# Patient Record
Sex: FEMALE | Race: WHITE | Employment: OTHER | URBAN - METROPOLITAN AREA
[De-identification: names, ages, dates, MRNs, and addresses within clinical notes are randomized per-mention and may not be internally consistent; named-entity substitution may affect disease eponyms.]

---

## 2017-03-02 ENCOUNTER — APPOINTMENT (OUTPATIENT)
Dept: GENERAL RADIOLOGY | Age: 82
End: 2017-03-02
Attending: EMERGENCY MEDICINE
Payer: MEDICARE

## 2017-03-02 ENCOUNTER — APPOINTMENT (OUTPATIENT)
Dept: CT IMAGING | Age: 82
End: 2017-03-02
Attending: EMERGENCY MEDICINE
Payer: MEDICARE

## 2017-03-02 ENCOUNTER — HOSPITAL ENCOUNTER (EMERGENCY)
Age: 82
Discharge: OTHER INSTITUTION | End: 2017-03-02
Attending: EMERGENCY MEDICINE
Payer: MEDICARE

## 2017-03-02 VITALS
WEIGHT: 126 LBS | TEMPERATURE: 97.8 F | HEART RATE: 78 BPM | SYSTOLIC BLOOD PRESSURE: 154 MMHG | OXYGEN SATURATION: 96 % | DIASTOLIC BLOOD PRESSURE: 84 MMHG | BODY MASS INDEX: 22.32 KG/M2 | RESPIRATION RATE: 16 BRPM | HEIGHT: 63 IN

## 2017-03-02 DIAGNOSIS — R05.9 COUGH: Primary | ICD-10-CM

## 2017-03-02 LAB
ALBUMIN SERPL BCP-MCNC: 2.8 G/DL (ref 3.2–4.6)
ALBUMIN/GLOB SERPL: 0.8 {RATIO} (ref 1.2–3.5)
ALP SERPL-CCNC: 99 U/L (ref 50–136)
ALT SERPL-CCNC: 23 U/L (ref 12–65)
ANION GAP BLD CALC-SCNC: 6 MMOL/L (ref 7–16)
AST SERPL W P-5'-P-CCNC: 17 U/L (ref 15–37)
BASOPHILS # BLD AUTO: 0 K/UL (ref 0–0.2)
BASOPHILS # BLD: 0 % (ref 0–2)
BILIRUB SERPL-MCNC: 0.4 MG/DL (ref 0.2–1.1)
BUN SERPL-MCNC: 22 MG/DL (ref 8–23)
CALCIUM SERPL-MCNC: 9.2 MG/DL (ref 8.3–10.4)
CHLORIDE SERPL-SCNC: 100 MMOL/L (ref 98–107)
CO2 SERPL-SCNC: 38 MMOL/L (ref 21–32)
CREAT SERPL-MCNC: 0.4 MG/DL (ref 0.6–1)
DIFFERENTIAL METHOD BLD: ABNORMAL
EOSINOPHIL # BLD: 0 K/UL (ref 0–0.8)
EOSINOPHIL NFR BLD: 1 % (ref 0.5–7.8)
ERYTHROCYTE [DISTWIDTH] IN BLOOD BY AUTOMATED COUNT: 14.5 % (ref 11.9–14.6)
FLUAV AG NPH QL IA: NEGATIVE
FLUBV AG NPH QL IA: NEGATIVE
GLOBULIN SER CALC-MCNC: 3.3 G/DL (ref 2.3–3.5)
GLUCOSE SERPL-MCNC: 105 MG/DL (ref 65–100)
HCT VFR BLD AUTO: 34.7 % (ref 35.8–46.3)
HGB BLD-MCNC: 11 G/DL (ref 11.7–15.4)
IMM GRANULOCYTES # BLD: 0 K/UL (ref 0–0.5)
IMM GRANULOCYTES NFR BLD AUTO: 0.3 % (ref 0–5)
LACTATE BLD-SCNC: 1.4 MMOL/L (ref 0.5–1.9)
LYMPHOCYTES # BLD AUTO: 23 % (ref 13–44)
LYMPHOCYTES # BLD: 1.3 K/UL (ref 0.5–4.6)
MCH RBC QN AUTO: 30.1 PG (ref 26.1–32.9)
MCHC RBC AUTO-ENTMCNC: 31.7 G/DL (ref 31.4–35)
MCV RBC AUTO: 94.8 FL (ref 79.6–97.8)
MONOCYTES # BLD: 0.3 K/UL (ref 0.1–1.3)
MONOCYTES NFR BLD AUTO: 6 % (ref 4–12)
NEUTS SEG # BLD: 4 K/UL (ref 1.7–8.2)
NEUTS SEG NFR BLD AUTO: 70 % (ref 43–78)
PLATELET # BLD AUTO: 160 K/UL (ref 150–450)
PMV BLD AUTO: 11 FL (ref 10.8–14.1)
POTASSIUM SERPL-SCNC: 3.7 MMOL/L (ref 3.5–5.1)
PROT SERPL-MCNC: 6.1 G/DL (ref 6.3–8.2)
RBC # BLD AUTO: 3.66 M/UL (ref 4.05–5.25)
SODIUM SERPL-SCNC: 144 MMOL/L (ref 136–145)
WBC # BLD AUTO: 5.8 K/UL (ref 4.3–11.1)

## 2017-03-02 PROCEDURE — 99285 EMERGENCY DEPT VISIT HI MDM: CPT | Performed by: EMERGENCY MEDICINE

## 2017-03-02 PROCEDURE — 80053 COMPREHEN METABOLIC PANEL: CPT | Performed by: EMERGENCY MEDICINE

## 2017-03-02 PROCEDURE — 87804 INFLUENZA ASSAY W/OPTIC: CPT | Performed by: EMERGENCY MEDICINE

## 2017-03-02 PROCEDURE — 83605 ASSAY OF LACTIC ACID: CPT

## 2017-03-02 PROCEDURE — 71250 CT THORAX DX C-: CPT

## 2017-03-02 PROCEDURE — 74011250637 HC RX REV CODE- 250/637: Performed by: EMERGENCY MEDICINE

## 2017-03-02 PROCEDURE — 85025 COMPLETE CBC W/AUTO DIFF WBC: CPT | Performed by: EMERGENCY MEDICINE

## 2017-03-02 PROCEDURE — 71020 XR CHEST PA LAT: CPT

## 2017-03-02 PROCEDURE — 87040 BLOOD CULTURE FOR BACTERIA: CPT | Performed by: EMERGENCY MEDICINE

## 2017-03-02 RX ORDER — FLUCONAZOLE 100 MG/1
100 TABLET ORAL
Status: COMPLETED | OUTPATIENT
Start: 2017-03-02 | End: 2017-03-02

## 2017-03-02 RX ADMIN — FLUCONAZOLE 100 MG: 100 TABLET ORAL at 14:18

## 2017-03-02 NOTE — DISCHARGE INSTRUCTIONS
Cough: Care Instructions  Your Care Instructions  A cough is your body's response to something that bothers your throat or airways. Many things can cause a cough. You might cough because of a cold or the flu, bronchitis, or asthma. Smoking, postnasal drip, allergies, and stomach acid that backs up into your throat also can cause coughs. A cough is a symptom, not a disease. Most coughs stop when the cause, such as a cold, goes away. You can take a few steps at home to cough less and feel better. Follow-up care is a key part of your treatment and safety. Be sure to make and go to all appointments, and call your doctor if you are having problems. It's also a good idea to know your test results and keep a list of the medicines you take. How can you care for yourself at home? · Drink lots of water and other fluids. This helps thin the mucus and soothes a dry or sore throat. Honey or lemon juice in hot water or tea may ease a dry cough. · Take cough medicine as directed by your doctor. · Prop up your head on pillows to help you breathe and ease a dry cough. · Try cough drops to soothe a dry or sore throat. Cough drops don't stop a cough. Medicine-flavored cough drops are no better than candy-flavored drops or hard candy. · Do not smoke. Avoid secondhand smoke. If you need help quitting, talk to your doctor about stop-smoking programs and medicines. These can increase your chances of quitting for good. When should you call for help? Call 911 anytime you think you may need emergency care. For example, call if:  · You have severe trouble breathing. Call your doctor now or seek immediate medical care if:  · You cough up blood. · You have new or worse trouble breathing. · You have a new or higher fever. · You have a new rash.   Watch closely for changes in your health, and be sure to contact your doctor if:  · You cough more deeply or more often, especially if you notice more mucus or a change in the color of your mucus. · You have new symptoms, such as a sore throat, an earache, or sinus pain. · You do not get better as expected. Where can you learn more? Go to http://marianela-lelo.info/. Enter D279 in the search box to learn more about \"Cough: Care Instructions. \"  Current as of: May 27, 2016  Content Version: 11.1  © 7423-0783 [employer]. Care instructions adapted under license by [employer] (which disclaims liability or warranty for this information). If you have questions about a medical condition or this instruction, always ask your healthcare professional. [address] any warranty or liability for your use of this information.

## 2017-03-02 NOTE — ED NOTES
Report called to primary RN for pt at [facility]. I have reviewed discharge instructions with the patient. The patient verbalized understanding. Patient denies any further needs, questions, or concerns at this time. No adverse reactions to any treatments, meds, or procedures noted. Transport contacted for pt transport back to facility.

## 2017-03-02 NOTE — ED PROVIDER NOTES
HPI Comments: Patient arrives by EMS from nursing home in [place]. December 22 she had a stroke and since that time has had problems with urinary tract infections and recurring pulmonary infections/pneumonia. Does report her being on 3 rounds of antibiotics for respiratory issues. This morning she was sent over by staff at the nursing facility due to some worsening respiratory status and told pneumonia on CXR. She is presently on antibiotics. The history is provided by the patient. No past medical history on file. No past surgical history on file. No family history on file. Social History     Social History    Marital status: N/A     Spouse name: N/A    Number of children: N/A    Years of education: N/A     Occupational History    Not on file. Social History Main Topics    Smoking status: Not on file    Smokeless tobacco: Not on file    Alcohol use Not on file    Drug use: Not on file    Sexual activity: Not on file     Other Topics Concern    Not on file     Social History Narrative         ALLERGIES: Review of patient's allergies indicates not on file. Review of Systems   Constitutional: Positive for fatigue. Negative for chills and fever. Respiratory: Negative. Cardiovascular: Negative for chest pain. Gastrointestinal: Negative for diarrhea and vomiting. Genitourinary: Negative. Musculoskeletal: Negative. Neurological: Negative. Psychiatric/Behavioral: Negative. All other systems reviewed and are negative. Vitals:    03/02/17 1213   BP: 168/78   Pulse: 73   Resp: 18   Temp: 98.3 °F (36.8 °C)   SpO2: 95%   Weight: 57.2 kg (126 lb)   Height: 5' 3\" (1.6 m)            Physical Exam   Constitutional: No distress. Frail/elderly  Not toxic or septic   HENT:   Head: Atraumatic. Mod thrush(family aware of prior)   Eyes: No scleral icterus. Neck: Neck supple. Cardiovascular: Normal rate, regular rhythm and intact distal pulses.     Pulmonary/Chest: Effort normal. No respiratory distress. She has no wheezes. Abdominal: Soft. There is no tenderness. There is no rebound. Musculoskeletal: Normal range of motion. Neurological: She is alert. Skin: Skin is warm and dry. Psychiatric: Her behavior is normal. Thought content normal.   Nursing note and vitals reviewed. MDM  Number of Diagnoses or Management Options  Cough:   Diagnosis management comments: Persistent cough. On antibiotics.  No pneumonia on CXR but family concerned (CT also negative)       Amount and/or Complexity of Data Reviewed  Clinical lab tests: ordered and reviewed  Tests in the radiology section of CPT®: reviewed and ordered  Decide to obtain previous medical records or to obtain history from someone other than the patient: yes    Risk of Complications, Morbidity, and/or Mortality  Presenting problems: high  Diagnostic procedures: low  Management options: moderate    Patient Progress  Patient progress: stable    ED Course       Procedures

## 2017-03-02 NOTE — ED TRIAGE NOTES
Ems reports pt has cough and advantageous lung sounds on the right lower lobe. Pt on 3 round of antibiotics for pneumonia. Staff at nursing home states the pt is not getting any better.

## 2017-03-03 ENCOUNTER — APPOINTMENT (OUTPATIENT)
Dept: GENERAL RADIOLOGY | Age: 82
End: 2017-03-03
Attending: EMERGENCY MEDICINE
Payer: MEDICARE

## 2017-03-03 ENCOUNTER — HOSPITAL ENCOUNTER (EMERGENCY)
Age: 82
Discharge: NURSING, REHAB OR LONG-TERM CARE | End: 2017-03-03
Attending: EMERGENCY MEDICINE
Payer: MEDICARE

## 2017-03-03 ENCOUNTER — APPOINTMENT (OUTPATIENT)
Dept: CT IMAGING | Age: 82
End: 2017-03-03
Attending: EMERGENCY MEDICINE
Payer: MEDICARE

## 2017-03-03 VITALS
DIASTOLIC BLOOD PRESSURE: 81 MMHG | TEMPERATURE: 97.9 F | OXYGEN SATURATION: 97 % | HEART RATE: 89 BPM | RESPIRATION RATE: 16 BRPM | SYSTOLIC BLOOD PRESSURE: 154 MMHG

## 2017-03-03 DIAGNOSIS — R10.9 FLANK PAIN: Primary | ICD-10-CM

## 2017-03-03 DIAGNOSIS — J90 PLEURAL EFFUSION: ICD-10-CM

## 2017-03-03 LAB
AMORPH CRY URNS QL MICRO: ABNORMAL
ANION GAP BLD CALC-SCNC: 7 MMOL/L (ref 7–16)
BACTERIA URNS QL MICRO: 0 /HPF
BASOPHILS # BLD AUTO: 0 K/UL (ref 0–0.2)
BASOPHILS # BLD: 0 % (ref 0–2)
BUN SERPL-MCNC: 24 MG/DL (ref 8–23)
CALCIUM SERPL-MCNC: 9.3 MG/DL (ref 8.3–10.4)
CASTS URNS QL MICRO: 0 /LPF
CHLORIDE SERPL-SCNC: 99 MMOL/L (ref 98–107)
CO2 SERPL-SCNC: 34 MMOL/L (ref 21–32)
CREAT SERPL-MCNC: 0.38 MG/DL (ref 0.6–1)
CRYSTALS URNS QL MICRO: 0 /LPF
DIFFERENTIAL METHOD BLD: ABNORMAL
EOSINOPHIL # BLD: 0.1 K/UL (ref 0–0.8)
EOSINOPHIL NFR BLD: 1 % (ref 0.5–7.8)
EPI CELLS #/AREA URNS HPF: ABNORMAL /HPF
ERYTHROCYTE [DISTWIDTH] IN BLOOD BY AUTOMATED COUNT: 14.4 % (ref 11.9–14.6)
GLUCOSE SERPL-MCNC: 117 MG/DL (ref 65–100)
HCT VFR BLD AUTO: 35 % (ref 35.8–46.3)
HGB BLD-MCNC: 11.4 G/DL (ref 11.7–15.4)
IMM GRANULOCYTES # BLD: 0 K/UL (ref 0–0.5)
IMM GRANULOCYTES NFR BLD AUTO: 0.4 % (ref 0–5)
INR PPP: 1.1 (ref 0.9–1.2)
LACTATE BLD-SCNC: 1.5 MMOL/L (ref 0.5–1.9)
LYMPHOCYTES # BLD AUTO: 27 % (ref 13–44)
LYMPHOCYTES # BLD: 1.9 K/UL (ref 0.5–4.6)
MCH RBC QN AUTO: 30.5 PG (ref 26.1–32.9)
MCHC RBC AUTO-ENTMCNC: 32.6 G/DL (ref 31.4–35)
MCV RBC AUTO: 93.6 FL (ref 79.6–97.8)
MONOCYTES # BLD: 0.4 K/UL (ref 0.1–1.3)
MONOCYTES NFR BLD AUTO: 6 % (ref 4–12)
MUCOUS THREADS URNS QL MICRO: 0 /LPF
NEUTS SEG # BLD: 4.7 K/UL (ref 1.7–8.2)
NEUTS SEG NFR BLD AUTO: 66 % (ref 43–78)
OTHER OBSERVATIONS,UCOM: ABNORMAL
PLATELET # BLD AUTO: 173 K/UL (ref 150–450)
PMV BLD AUTO: 11.3 FL (ref 10.8–14.1)
POTASSIUM SERPL-SCNC: 3.5 MMOL/L (ref 3.5–5.1)
PROTHROMBIN TIME: 11.7 SEC (ref 9.6–12)
RBC # BLD AUTO: 3.74 M/UL (ref 4.05–5.25)
RBC #/AREA URNS HPF: ABNORMAL /HPF
SODIUM SERPL-SCNC: 140 MMOL/L (ref 136–145)
WBC # BLD AUTO: 7.1 K/UL (ref 4.3–11.1)
WBC URNS QL MICRO: ABNORMAL /HPF

## 2017-03-03 PROCEDURE — 74011250636 HC RX REV CODE- 250/636: Performed by: EMERGENCY MEDICINE

## 2017-03-03 PROCEDURE — 99285 EMERGENCY DEPT VISIT HI MDM: CPT | Performed by: EMERGENCY MEDICINE

## 2017-03-03 PROCEDURE — 74177 CT ABD & PELVIS W/CONTRAST: CPT

## 2017-03-03 PROCEDURE — 87086 URINE CULTURE/COLONY COUNT: CPT | Performed by: EMERGENCY MEDICINE

## 2017-03-03 PROCEDURE — 96376 TX/PRO/DX INJ SAME DRUG ADON: CPT | Performed by: EMERGENCY MEDICINE

## 2017-03-03 PROCEDURE — 81003 URINALYSIS AUTO W/O SCOPE: CPT | Performed by: EMERGENCY MEDICINE

## 2017-03-03 PROCEDURE — 85025 COMPLETE CBC W/AUTO DIFF WBC: CPT | Performed by: EMERGENCY MEDICINE

## 2017-03-03 PROCEDURE — 71010 XR CHEST SNGL V: CPT

## 2017-03-03 PROCEDURE — 83605 ASSAY OF LACTIC ACID: CPT

## 2017-03-03 PROCEDURE — 74011000258 HC RX REV CODE- 258: Performed by: EMERGENCY MEDICINE

## 2017-03-03 PROCEDURE — 77030011943

## 2017-03-03 PROCEDURE — 81015 MICROSCOPIC EXAM OF URINE: CPT | Performed by: EMERGENCY MEDICINE

## 2017-03-03 PROCEDURE — 85610 PROTHROMBIN TIME: CPT | Performed by: EMERGENCY MEDICINE

## 2017-03-03 PROCEDURE — 80048 BASIC METABOLIC PNL TOTAL CA: CPT | Performed by: EMERGENCY MEDICINE

## 2017-03-03 PROCEDURE — 96361 HYDRATE IV INFUSION ADD-ON: CPT | Performed by: EMERGENCY MEDICINE

## 2017-03-03 PROCEDURE — 96375 TX/PRO/DX INJ NEW DRUG ADDON: CPT | Performed by: EMERGENCY MEDICINE

## 2017-03-03 PROCEDURE — 96365 THER/PROPH/DIAG IV INF INIT: CPT | Performed by: EMERGENCY MEDICINE

## 2017-03-03 PROCEDURE — 74011636320 HC RX REV CODE- 636/320: Performed by: EMERGENCY MEDICINE

## 2017-03-03 PROCEDURE — 51701 INSERT BLADDER CATHETER: CPT | Performed by: EMERGENCY MEDICINE

## 2017-03-03 RX ORDER — FACIAL-BODY WIPES
10 EACH TOPICAL DAILY
COMMUNITY

## 2017-03-03 RX ORDER — SODIUM CHLORIDE 9 MG/ML
500 INJECTION, SOLUTION INTRAVENOUS ONCE
Status: COMPLETED | OUTPATIENT
Start: 2017-03-03 | End: 2017-03-03

## 2017-03-03 RX ORDER — MAG HYDROX/ALUMINUM HYD/SIMETH 200-200-20
30 SUSPENSION, ORAL (FINAL DOSE FORM) ORAL
COMMUNITY

## 2017-03-03 RX ORDER — LEVOFLOXACIN 500 MG/1
500 TABLET, FILM COATED ORAL DAILY
COMMUNITY
End: 2017-03-03

## 2017-03-03 RX ORDER — ATORVASTATIN CALCIUM 40 MG/1
40 TABLET, FILM COATED ORAL DAILY
COMMUNITY

## 2017-03-03 RX ORDER — ASPIRIN 325 MG
325 TABLET ORAL DAILY
COMMUNITY

## 2017-03-03 RX ORDER — ADHESIVE BANDAGE
BANDAGE TOPICAL DAILY
COMMUNITY

## 2017-03-03 RX ORDER — SUCRALFATE 1 G/1
1 TABLET ORAL 4 TIMES DAILY
COMMUNITY

## 2017-03-03 RX ORDER — TAMSULOSIN HYDROCHLORIDE 0.4 MG/1
0.4 CAPSULE ORAL DAILY
COMMUNITY

## 2017-03-03 RX ORDER — MORPHINE SULFATE 2 MG/ML
2 INJECTION, SOLUTION INTRAMUSCULAR; INTRAVENOUS
Status: COMPLETED | OUTPATIENT
Start: 2017-03-03 | End: 2017-03-03

## 2017-03-03 RX ORDER — MORPHINE SULFATE 4 MG/ML
4 INJECTION, SOLUTION INTRAMUSCULAR; INTRAVENOUS
Status: DISCONTINUED | OUTPATIENT
Start: 2017-03-03 | End: 2017-03-03

## 2017-03-03 RX ORDER — SODIUM CHLORIDE 0.9 % (FLUSH) 0.9 %
10 SYRINGE (ML) INJECTION
Status: COMPLETED | OUTPATIENT
Start: 2017-03-03 | End: 2017-03-03

## 2017-03-03 RX ORDER — CETIRIZINE HCL 10 MG
10 TABLET ORAL
COMMUNITY
End: 2017-04-28

## 2017-03-03 RX ORDER — MORPHINE SULFATE 4 MG/ML
2 INJECTION, SOLUTION INTRAMUSCULAR; INTRAVENOUS
Status: COMPLETED | OUTPATIENT
Start: 2017-03-03 | End: 2017-03-03

## 2017-03-03 RX ORDER — MORPHINE SULFATE 2 MG/ML
2 INJECTION, SOLUTION INTRAMUSCULAR; INTRAVENOUS ONCE
Status: COMPLETED | OUTPATIENT
Start: 2017-03-03 | End: 2017-03-03

## 2017-03-03 RX ORDER — BACLOFEN 10 MG/1
10 TABLET ORAL 2 TIMES DAILY
COMMUNITY

## 2017-03-03 RX ORDER — METOPROLOL SUCCINATE 25 MG/1
12.5 TABLET, EXTENDED RELEASE ORAL 2 TIMES DAILY
COMMUNITY

## 2017-03-03 RX ORDER — ONDANSETRON 2 MG/ML
4 INJECTION INTRAMUSCULAR; INTRAVENOUS
Status: COMPLETED | OUTPATIENT
Start: 2017-03-03 | End: 2017-03-03

## 2017-03-03 RX ORDER — LISINOPRIL 2.5 MG/1
2.5 TABLET ORAL DAILY
COMMUNITY

## 2017-03-03 RX ORDER — GABAPENTIN 300 MG/1
300 CAPSULE ORAL
COMMUNITY

## 2017-03-03 RX ORDER — ACETAMINOPHEN 325 MG/1
650 TABLET ORAL
COMMUNITY

## 2017-03-03 RX ORDER — SULFAMETHOXAZOLE AND TRIMETHOPRIM 200; 40 MG/5ML; MG/5ML
20 SUSPENSION ORAL 2 TIMES DAILY
Qty: 280 ML | Refills: 0 | Status: SHIPPED | OUTPATIENT
Start: 2017-03-03 | End: 2017-03-10

## 2017-03-03 RX ADMIN — SODIUM CHLORIDE 100 ML: 900 INJECTION, SOLUTION INTRAVENOUS at 17:36

## 2017-03-03 RX ADMIN — CEFTRIAXONE 1 G: 1 INJECTION, POWDER, FOR SOLUTION INTRAMUSCULAR; INTRAVENOUS at 17:59

## 2017-03-03 RX ADMIN — Medication 10 ML: at 17:35

## 2017-03-03 RX ADMIN — Medication 2 MG: at 17:17

## 2017-03-03 RX ADMIN — Medication 2 MG: at 18:27

## 2017-03-03 RX ADMIN — Medication 2 MG: at 20:06

## 2017-03-03 RX ADMIN — IOVERSOL 100 ML: 741 INJECTION INTRA-ARTERIAL; INTRAVENOUS at 17:36

## 2017-03-03 RX ADMIN — MORPHINE SULFATE 2 MG: 4 INJECTION, SOLUTION INTRAMUSCULAR; INTRAVENOUS at 16:26

## 2017-03-03 RX ADMIN — DIATRIZOATE MEGLUMINE AND DIATRIZOATE SODIUM 30 ML: 600; 100 SOLUTION ORAL; RECTAL at 17:06

## 2017-03-03 RX ADMIN — SODIUM CHLORIDE 500 ML: 900 INJECTION, SOLUTION INTRAVENOUS at 16:04

## 2017-03-03 RX ADMIN — ONDANSETRON 4 MG: 2 INJECTION INTRAMUSCULAR; INTRAVENOUS at 16:26

## 2017-03-03 NOTE — ED PROVIDER NOTES
Patient is a 80 y.o. female presenting with flank pain and melena. The history is provided by the patient and a relative. Flank Pain    This is a new problem. The current episode started yesterday. The problem has not changed since onset. The problem occurs constantly. Patient reports not work related injury. The pain is associated with no known injury. The quality of the pain is described as aching. The pain is at a severity of 7/10. The pain is moderate. Pertinent negatives include no chest pain, no fever, no headaches and no abdominal pain. She has tried heat for the symptoms. The treatment provided mild relief. PEG tube s/p CVA 2016  Melena    Pertinent negatives include no fever, no abdominal pain, no chest pain, no headaches and no coughing. Past Medical History:   Diagnosis Date    hyperlipidemia     Hypertension        No past surgical history on file. No family history on file. Social History     Social History    Marital status:      Spouse name: N/A    Number of children: N/A    Years of education: N/A     Occupational History    Not on file. Social History Main Topics    Smoking status: Not on file    Smokeless tobacco: Not on file    Alcohol use Not on file    Drug use: Not on file    Sexual activity: Not on file     Other Topics Concern    Not on file     Social History Narrative    No narrative on file         ALLERGIES: Codeine; Demerol [meperidine]; Novocain [procaine]; and Stadol [butorphanol tartrate]    Review of Systems   Constitutional: Negative. Negative for chills and fever. HENT: Negative. Negative for congestion, ear pain, postnasal drip and rhinorrhea. Eyes: Negative for pain and visual disturbance. Respiratory: Negative for cough and wheezing. Cardiovascular: Negative for chest pain and leg swelling. Gastrointestinal: Positive for melena. Negative for abdominal distention and abdominal pain. Endocrine: Negative.   Negative for polydipsia, polyphagia and polyuria. Genitourinary: Positive for flank pain. Negative for difficulty urinating and frequency. Musculoskeletal: Negative for arthralgias and myalgias. Skin: Negative. Neurological: Negative. Negative for dizziness and headaches. Hematological: Negative. Vitals:    03/03/17 1545   BP: (!) 191/95   Pulse: 82   Resp: 16   Temp: 97.7 °F (36.5 °C)   SpO2: 96%            Physical Exam   Constitutional: She is oriented to person, place, and time. She appears well-developed and well-nourished. Non-toxic appearance. She has a sickly appearance. She appears ill. No distress. HENT:   Head: Normocephalic and atraumatic. Neck: Normal range of motion. Neck supple. Cardiovascular: Intact distal pulses. Pulmonary/Chest: Effort normal. No respiratory distress. She has no wheezes. Abdominal: Soft. Bowel sounds are normal. She exhibits no distension and no mass. There is no tenderness. There is CVA tenderness. There is no guarding. Neurological: She is alert and oriented to person, place, and time. Skin: Skin is warm and dry. No erythema. Psychiatric: She has a normal mood and affect. Her behavior is normal.   Nursing note and vitals reviewed. MDM  Number of Diagnoses or Management Options  Flank pain: new and requires workup  Pleural effusion: new and requires workup  Diagnosis management comments: 1 BM in last 24hr noted to have blood in it. No diarrhea and/or consistent symptoms other than left flank pain. Morphine helping. ? UTI with grossly abnormal urine. Vomiting yesterday but hasn't repeated. No fever. CT pending of abdomen/pelvis. Antibiotic given for what appears to be UTI after specimens collected. Oral contrast given through PEG since NPO since CVA.        Amount and/or Complexity of Data Reviewed  Clinical lab tests: ordered and reviewed (Results for orders placed or performed during the hospital encounter of 03/03/17  -CBC WITH AUTOMATED DIFF       Result                                            Value                         Ref Range                       WBC                                               7.1                           4.3 - 11.1 K/uL                 RBC                                               3.74 (L)                      4.05 - 5.25 M/uL                HGB                                               11.4 (L)                      11.7 - 15.4 g/dL                HCT                                               35.0 (L)                      35.8 - 46.3 %                   MCV                                               93.6                          79.6 - 97.8 FL                  MCH                                               30.5                          26.1 - 32.9 PG                  MCHC                                              32.6                          31.4 - 35.0 g/dL                RDW                                               14.4                          11.9 - 14.6 %                   PLATELET                                          173                           150 - 450 K/uL                  MPV                                               11.3                          10.8 - 14.1 FL                  DF                                                AUTOMATED                                                     NEUTROPHILS                                       66                            43 - 78 %                       LYMPHOCYTES                                       27                            13 - 44 %                       MONOCYTES                                         6                             4.0 - 12.0 %                    EOSINOPHILS                                       1                             0.5 - 7.8 %                     BASOPHILS                                         0                             0.0 - 2.0 %                     IMMATURE GRANULOCYTES 0.4                           0.0 - 5.0 %                     ABS. NEUTROPHILS                                  4.7                           1.7 - 8.2 K/UL                  ABS. LYMPHOCYTES                                  1.9                           0.5 - 4.6 K/UL                  ABS. MONOCYTES                                    0.4                           0.1 - 1.3 K/UL                  ABS. EOSINOPHILS                                  0.1                           0.0 - 0.8 K/UL                  ABS. BASOPHILS                                    0.0                           0.0 - 0.2 K/UL                  ABS. IMM.  GRANS.                                  0.0                           0.0 - 0.5 K/UL             -METABOLIC PANEL, BASIC       Result                                            Value                         Ref Range                       Sodium                                            140                           136 - 145 mmol/L                Potassium                                         3.5                           3.5 - 5.1 mmol/L                Chloride                                          99                            98 - 107 mmol/L                 CO2                                               34 (H)                        21 - 32 mmol/L                  Anion gap                                         7                             7 - 16 mmol/L                   Glucose                                           117 (H)                       65 - 100 mg/dL                  BUN                                               24 (H)                        8 - 23 MG/DL                    Creatinine                                        0.38 (L)                      0.6 - 1.0 MG/DL                 GFR est AA                                        >60                           >60 ml/min/1.73m2               GFR est non-AA >60                           >60 ml/min/1.73m2               Calcium                                           9.3                           8.3 - 10.4 MG/DL           -PROTHROMBIN TIME + INR       Result                                            Value                         Ref Range                       Prothrombin time                                  11.7                          9.6 - 12.0 sec                  INR                                               1.1                           0.9 - 1.2                  -POC LACTIC ACID       Result                                            Value                         Ref Range                       Lactic Acid (POC)                                 1.5                           0.5 - 1.9 mmol/L           )  Tests in the radiology section of CPT®: ordered and reviewed  Obtain history from someone other than the patient: yes  Review and summarize past medical records: yes  Independent visualization of images, tracings, or specimens: yes    Risk of Complications, Morbidity, and/or Mortality  Presenting problems: moderate  Diagnostic procedures: moderate  Management options: moderate  General comments: Elements of this note have been dictated via voice recognition software. Text and phrases may be limited by the accuracy of the software. The chart has been reviewed, but errors may still be present.       Patient Progress  Patient progress: improved    ED Course       Procedures

## 2017-03-03 NOTE — ED TRIAGE NOTES
Pt arrives via EMS. Pt complains of blood in her stool and left flank pain that started around 12:30am this morning. Pt daughter also states when she started vomiting when she got back from the ER this morning. EMS placed heating pad on left flank, which pt stated it helped some.

## 2017-03-04 NOTE — DISCHARGE INSTRUCTIONS

## 2017-03-05 LAB
BACTERIA SPEC CULT: NORMAL
SERVICE CMNT-IMP: NORMAL

## 2017-03-07 LAB
BACTERIA SPEC CULT: NORMAL
BACTERIA SPEC CULT: NORMAL
SERVICE CMNT-IMP: NORMAL
SERVICE CMNT-IMP: NORMAL

## 2017-03-22 ENCOUNTER — HOSPITAL ENCOUNTER (EMERGENCY)
Age: 82
Discharge: HOME | End: 2017-03-22
Attending: EMERGENCY MEDICINE
Payer: MEDICARE

## 2017-03-22 VITALS
OXYGEN SATURATION: 92 % | RESPIRATION RATE: 18 BRPM | SYSTOLIC BLOOD PRESSURE: 146 MMHG | DIASTOLIC BLOOD PRESSURE: 83 MMHG | HEART RATE: 93 BPM | TEMPERATURE: 98.6 F

## 2017-03-22 DIAGNOSIS — T85.598A FEEDING TUBE DYSFUNCTION, INITIAL ENCOUNTER: Primary | ICD-10-CM

## 2017-03-22 PROCEDURE — 99284 EMERGENCY DEPT VISIT MOD MDM: CPT | Performed by: EMERGENCY MEDICINE

## 2017-03-22 NOTE — ED PROVIDER NOTES
HPI Comments: Feeding tube clogged up yesterday, nursing home staff was able to recannalize it, using some warm Coca-[employer]a. Feeding tube clogged up again today, staff unable to get it flowing, after hours of trying. Patient is a 80 y.o. female presenting with feeding tube problem. The history is provided by a relative. Feeding Tube Problem    This is a new problem. The current episode started yesterday. Pertinent negatives include no nausea and no vomiting. Past Medical History:   Diagnosis Date    hyperlipidemia     Hypertension        History reviewed. No pertinent surgical history. History reviewed. No pertinent family history. Social History     Social History    Marital status:      Spouse name: N/A    Number of children: N/A    Years of education: N/A     Occupational History    Not on file. Social History Main Topics    Smoking status: Unknown If Ever Smoked    Smokeless tobacco: Not on file    Alcohol use Not on file    Drug use: Not on file    Sexual activity: Not on file     Other Topics Concern    Not on file     Social History Narrative         ALLERGIES: Codeine; Demerol [meperidine]; Novocain [procaine]; and Stadol [butorphanol tartrate]    Review of Systems   Gastrointestinal: Negative for abdominal pain, nausea and vomiting. Vitals:    03/22/17 1433   BP: 138/86   Pulse: 93   Resp: 18   Temp: 98.6 °F (37 °C)   SpO2: 100%            Physical Exam   Constitutional: She is oriented to person, place, and time. She appears well-developed and well-nourished. No distress. HENT:   Head: Normocephalic and atraumatic. Eyes: Conjunctivae and EOM are normal. Right eye exhibits no discharge. Left eye exhibits no discharge. Neck: Normal range of motion. Neck supple. Pulmonary/Chest: Effort normal. No respiratory distress. Abdominal: Soft. She exhibits no distension. There is no tenderness. There is no rebound.    Long feeding tube in good position, needs replacement   Musculoskeletal: Normal range of motion. Neurological: She is alert and oriented to person, place, and time. She has normal strength. She exhibits normal muscle tone. cni 2-12 grossly  Nl gait,  Nl speech     Skin: Skin is warm and dry. No rash noted. She is not diaphoretic. Psychiatric: She has a normal mood and affect. Her behavior is normal.   Nursing note and vitals reviewed. MDM  Number of Diagnoses or Management Options  Diagnosis management comments: Medical decision making note:  Clogged feeding tube, warrants replacing to get it flowing. This concludes the \"medical decision making note\" part of this emergency department visit note.       ED Course       Procedures

## 2017-03-22 NOTE — ED TRIAGE NOTES
From [facility] with blocked PEG tube. Vomited en route to ED with EMS and given zofran and fluids.

## 2017-03-22 NOTE — ED NOTES
Discharge instructions given verbally to pt's daughter, she verbalized understanding of instructions given. Written discharge instructions sent to SNF with pt. G-tube working well, flushes easily. PIV removed and pt left the ER via [facility] to [facility] in [place].

## 2017-03-22 NOTE — DISCHARGE INSTRUCTIONS
Learning About Living With a Feeding Tube  What is tube feeding? Your body needs nutrition to stay strong and help you live a healthy life. If you're unable to eat, or if you have an illness that makes it hard to swallow food, you may need a feeding tube. The tube is placed in the stomach and is used to give food, liquids, and medicines. Depending on why you need a feeding tube, you may have it for several weeks or months or longer. When you first get a feeding tube, your biggest challenge may be your new relationship with food. For many people, eating and savoring food is one of the most pleasing parts of daily life. You may grieve the loss of the daily habit of eating and the social aspects of sharing food with others. If you've struggled to get enough nutrition--if it's been hard to eat or swallow--having a feeding tube can help you regain your health and strength. And understanding how a feeding tube works is a first step toward dealing with changes that come with having the tube. It can also help you avoid common problems that can occur. What can you expect when you have a feeding tube? After surgery to insert a feeding tube, you'll have a 6- to 12-inch tube coming out of your belly. The tube is about the same width as a pen. There are different ways the tube can be used for feeding. Your doctor will help you decide which is best for you and how often feedings should occur. · A feeding syringe. This is most common. A syringe is connected to the tube. A nutritional mixture (formula) is put into the syringe and flows into the tube and your stomach. This is called bolus feeding. · A gravity bag. Formula is placed into a special bag that is hung on a hook or a pole. The height and weight of the bag make the food flow down the tube and into your stomach. · A bag and pump. A pump is used to push formula from a bag through the tube. This is also called continuous feeding.   How do you use a feeding tube?  It's important that the food you use for tube feeding have the right blend of nutrients for you. And the food needs to be the correct thickness so the tube doesn't clog. For most people, a milk shake-type of formula works best for tube feeding. Your doctor or dietitian will help you find the right formula to use. Each time you use the tube for feeding:  · Make sure that the tube-feeding formula is at room temperature. · Wash your hands before you handle the tube and formula. Wash the top of the can of formula before you open it. · Follow your doctor's instructions for how much formula to use for each feeding. ¨ If using a feeding syringe: Connect the syringe to the tube, and put the formula into the syringe. Hold the syringe up high so the formula flows into the tube. Use the plunger on the syringe to gently push any remaining formula into the tube. ¨ If using a gravity bag: Connect the bag to the tube, and add the formula to the bag. Hang the bag on a hook or pole about 18 inches above the stomach. Depending on the type of formula, the food may take a few hours to flow through the tube. Ask your doctor what you can expect and how long it should take. ¨ If using a bag and pump, follow the instructions that come with the pump. · Sit up or keep your head up during the feeding and for 30 minutes after. · If you feel sick to your stomach or have stomach cramps during the feeding, slow the rate that the formula comes through the tube. Then slowly increase the rate as you can manage it. · Keep the formula in the refrigerator after you open it. Don't let the formula sit at room temperature for more than 8 hours. Throw away any open cans of food after 24 hours, even if they have been refrigerated. · Talk with your doctor about changing your feedings or medicines if you are having problems with diarrhea, constipation, or vomiting. How do you care for a feeding tube? · Keep it clean.  That's the most important thing you need to know about caring for your tube. Flush the tube with warm water before and after feedings or giving medicines. You can use a syringe to push water through the tube. Clean the end (opening) of the tube every day with an antiseptic wipe. · Always wash your hands before touching the tube. · Tape the tube to your body so the end is facing up. Look for medical tape in your local drugstore. It may irritate your skin less than other types of tape. Change the position of the tape every few days. · Clamp the tube when you're not using it. Put the clamp close to your body so that food and liquids don't run down the tube. · Keep the skin around the tube clean and dry. How do you avoid problems with a feeding tube? · Blocked tube. A blocked tube can happen when the tube isn't flushed or when formula or medicines are too thick. ¨ Prevent blockage by flushing the tube with warm water before and after using the tube. ¨ If the tube is blocked, try to clear it by flushing the tube. Call your doctor if the tube won't clear. ¨ Don't use a wire or anything else to try to unclog a tube. A wire can poke a hole in the tube. · Tube falls out. Don't try to put the tube back in by yourself. Call your doctor right away. The tube needs to be replaced before the opening in your belly closes, which can happen within hours. · Leaking tube. A tube that leaks may be blocked, or it may not fit right. After checking the tube and flushing it to make sure that the tube isn't blocked, call your doctor. Where can you learn more? Go to http://marianela-lelo.info/. Enter F416 in the search box to learn more about \"Learning About Living With a Feeding Tube. \"  Current as of: July 26, 2016  Content Version: 11.1  © 1988-2080 [employer]. Care instructions adapted under license by [employer] (which disclaims liability or warranty for this information).  If you have questions about a medical condition or this instruction, always ask your healthcare professional. [place] [address] any warranty or liability for your use of this information.

## 2017-04-27 ENCOUNTER — HOSPITAL ENCOUNTER (INPATIENT)
Age: 82
LOS: 1 days | Discharge: NURSING, REHAB OR LONG-TERM CARE | DRG: 872 | End: 2017-04-28
Admitting: HOSPITALIST
Payer: MEDICARE

## 2017-04-27 ENCOUNTER — APPOINTMENT (OUTPATIENT)
Dept: GENERAL RADIOLOGY | Age: 82
DRG: 872 | End: 2017-04-27
Payer: MEDICARE

## 2017-04-27 ENCOUNTER — APPOINTMENT (OUTPATIENT)
Dept: GENERAL RADIOLOGY | Age: 82
DRG: 872 | End: 2017-04-27
Attending: HOSPITALIST
Payer: MEDICARE

## 2017-04-27 DIAGNOSIS — R40.4 TRANSIENT ALTERATION OF AWARENESS: Primary | ICD-10-CM

## 2017-04-27 DIAGNOSIS — N39.0 URINARY TRACT INFECTION WITHOUT HEMATURIA, SITE UNSPECIFIED: ICD-10-CM

## 2017-04-27 PROBLEM — N30.00 ACUTE CYSTITIS: Status: ACTIVE | Noted: 2017-04-27

## 2017-04-27 PROBLEM — Z86.73 HISTORY OF CVA (CEREBROVASCULAR ACCIDENT): Status: ACTIVE | Noted: 2017-04-27

## 2017-04-27 PROBLEM — D64.9 ANEMIA: Status: ACTIVE | Noted: 2017-04-27

## 2017-04-27 PROBLEM — Z86.73 HISTORY OF CVA (CEREBROVASCULAR ACCIDENT): Chronic | Status: ACTIVE | Noted: 2017-04-27

## 2017-04-27 PROBLEM — I95.9 HYPOTENSION: Status: ACTIVE | Noted: 2017-04-27

## 2017-04-27 PROBLEM — A41.9 SEPSIS (HCC): Status: ACTIVE | Noted: 2017-04-27

## 2017-04-27 PROBLEM — D64.9 ANEMIA: Chronic | Status: ACTIVE | Noted: 2017-04-27

## 2017-04-27 PROBLEM — R41.82 ALTERED MENTAL STATUS, UNSPECIFIED: Status: ACTIVE | Noted: 2017-04-27

## 2017-04-27 PROBLEM — E86.0 DEHYDRATION: Status: ACTIVE | Noted: 2017-04-27

## 2017-04-27 PROBLEM — M54.9 PAIN IN BACK: Status: ACTIVE | Noted: 2017-04-27

## 2017-04-27 PROBLEM — M25.562 PAIN IN LEFT KNEE: Status: ACTIVE | Noted: 2017-04-27

## 2017-04-27 LAB
ALBUMIN SERPL BCP-MCNC: 2.1 G/DL (ref 3.2–4.6)
ALBUMIN/GLOB SERPL: 0.6 {RATIO} (ref 1.2–3.5)
ALP SERPL-CCNC: 91 U/L (ref 50–136)
ALT SERPL-CCNC: 15 U/L (ref 12–65)
ANION GAP BLD CALC-SCNC: 4 MMOL/L (ref 7–16)
APPEARANCE UR: ABNORMAL
AST SERPL W P-5'-P-CCNC: 11 U/L (ref 15–37)
ATRIAL RATE: 131 BPM
BACTERIA SPEC CULT: NORMAL
BACTERIA URNS QL MICRO: ABNORMAL /HPF
BACTERIA URNS QL MICRO: ABNORMAL /HPF
BASOPHILS # BLD AUTO: 0 K/UL (ref 0–0.2)
BASOPHILS # BLD: 0 % (ref 0–2)
BILIRUB SERPL-MCNC: 0.3 MG/DL (ref 0.2–1.1)
BILIRUB UR QL: NEGATIVE
BUN SERPL-MCNC: 20 MG/DL (ref 8–23)
CALCIUM SERPL-MCNC: 8.2 MG/DL (ref 8.3–10.4)
CALCULATED R AXIS, ECG10: 80 DEGREES
CALCULATED T AXIS, ECG11: 12 DEGREES
CASTS URNS QL MICRO: ABNORMAL /LPF
CASTS URNS QL MICRO: ABNORMAL /LPF
CHLORIDE SERPL-SCNC: 102 MMOL/L (ref 98–107)
CO2 SERPL-SCNC: 36 MMOL/L (ref 21–32)
COLOR UR: ABNORMAL
CREAT SERPL-MCNC: 0.55 MG/DL (ref 0.6–1)
DIAGNOSIS, 93000: NORMAL
DIFFERENTIAL METHOD BLD: ABNORMAL
EOSINOPHIL # BLD: 0.1 K/UL (ref 0–0.8)
EOSINOPHIL NFR BLD: 1 % (ref 0.5–7.8)
EPI CELLS #/AREA URNS HPF: 0 /HPF
EPI CELLS #/AREA URNS HPF: 0 /HPF
ERYTHROCYTE [DISTWIDTH] IN BLOOD BY AUTOMATED COUNT: 13.5 % (ref 11.9–14.6)
GLOBULIN SER CALC-MCNC: 3.4 G/DL (ref 2.3–3.5)
GLUCOSE SERPL-MCNC: 141 MG/DL (ref 65–100)
GLUCOSE UR STRIP.AUTO-MCNC: NEGATIVE MG/DL
HCT VFR BLD AUTO: 27.7 % (ref 35.8–46.3)
HGB BLD-MCNC: 9.2 G/DL (ref 11.7–15.4)
HGB UR QL STRIP: NEGATIVE
IMM GRANULOCYTES # BLD: 0.1 K/UL (ref 0–0.5)
IMM GRANULOCYTES NFR BLD AUTO: 0.6 % (ref 0–5)
KETONES UR QL STRIP.AUTO: NEGATIVE MG/DL
LACTATE BLD-SCNC: 1.2 MMOL/L (ref 0.5–1.9)
LEUKOCYTE ESTERASE UR QL STRIP.AUTO: ABNORMAL
LYMPHOCYTES # BLD AUTO: 7 % (ref 13–44)
LYMPHOCYTES # BLD: 0.8 K/UL (ref 0.5–4.6)
MCH RBC QN AUTO: 29.8 PG (ref 26.1–32.9)
MCHC RBC AUTO-ENTMCNC: 33.2 G/DL (ref 31.4–35)
MCV RBC AUTO: 89.6 FL (ref 79.6–97.8)
MONOCYTES # BLD: 0.8 K/UL (ref 0.1–1.3)
MONOCYTES NFR BLD AUTO: 7 % (ref 4–12)
NEUTS SEG # BLD: 9.3 K/UL (ref 1.7–8.2)
NEUTS SEG NFR BLD AUTO: 84 % (ref 43–78)
NITRITE UR QL STRIP.AUTO: POSITIVE
PH UR STRIP: 7.5 [PH] (ref 5–9)
PLATELET # BLD AUTO: 223 K/UL (ref 150–450)
PMV BLD AUTO: 10.1 FL (ref 10.8–14.1)
POTASSIUM SERPL-SCNC: 4 MMOL/L (ref 3.5–5.1)
PROCALCITONIN SERPL-MCNC: <0.1 NG/ML
PROT SERPL-MCNC: 5.5 G/DL (ref 6.3–8.2)
PROT UR STRIP-MCNC: NEGATIVE MG/DL
Q-T INTERVAL, ECG07: 380 MS
QRS DURATION, ECG06: 132 MS
QTC CALCULATION (BEZET), ECG08: 452 MS
RBC # BLD AUTO: 3.09 M/UL (ref 4.05–5.25)
RBC #/AREA URNS HPF: ABNORMAL /HPF
RBC #/AREA URNS HPF: ABNORMAL /HPF
SERVICE CMNT-IMP: NORMAL
SODIUM SERPL-SCNC: 142 MMOL/L (ref 136–145)
SP GR UR REFRACTOMETRY: 1.01 (ref 1–1.02)
UROBILINOGEN UR QL STRIP.AUTO: 1 EU/DL (ref 0.2–1)
VENTRICULAR RATE, ECG03: 85 BPM
WBC # BLD AUTO: 11.1 K/UL (ref 4.3–11.1)
WBC URNS QL MICRO: ABNORMAL /HPF
WBC URNS QL MICRO: ABNORMAL /HPF

## 2017-04-27 PROCEDURE — 97163 PT EVAL HIGH COMPLEX 45 MIN: CPT

## 2017-04-27 PROCEDURE — 74011250636 HC RX REV CODE- 250/636: Performed by: HOSPITALIST

## 2017-04-27 PROCEDURE — 93005 ELECTROCARDIOGRAM TRACING: CPT

## 2017-04-27 PROCEDURE — 51701 INSERT BLADDER CATHETER: CPT

## 2017-04-27 PROCEDURE — 0T9B70Z DRAINAGE OF BLADDER WITH DRAINAGE DEVICE, VIA NATURAL OR ARTIFICIAL OPENING: ICD-10-PCS

## 2017-04-27 PROCEDURE — 74011250636 HC RX REV CODE- 250/636: Performed by: INTERNAL MEDICINE

## 2017-04-27 PROCEDURE — 81003 URINALYSIS AUTO W/O SCOPE: CPT

## 2017-04-27 PROCEDURE — 80053 COMPREHEN METABOLIC PANEL: CPT

## 2017-04-27 PROCEDURE — 3E0G76Z INTRODUCTION OF NUTRITIONAL SUBSTANCE INTO UPPER GI, VIA NATURAL OR ARTIFICIAL OPENING: ICD-10-PCS | Performed by: HOSPITALIST

## 2017-04-27 PROCEDURE — 71010 XR CHEST PORT: CPT

## 2017-04-27 PROCEDURE — 96367 TX/PROPH/DG ADDL SEQ IV INF: CPT

## 2017-04-27 PROCEDURE — 94760 N-INVAS EAR/PLS OXIMETRY 1: CPT

## 2017-04-27 PROCEDURE — 74011250636 HC RX REV CODE- 250/636

## 2017-04-27 PROCEDURE — 74011000258 HC RX REV CODE- 258

## 2017-04-27 PROCEDURE — 96365 THER/PROPH/DIAG IV INF INIT: CPT

## 2017-04-27 PROCEDURE — 84145 PROCALCITONIN (PCT): CPT

## 2017-04-27 PROCEDURE — 83605 ASSAY OF LACTIC ACID: CPT

## 2017-04-27 PROCEDURE — 73560 X-RAY EXAM OF KNEE 1 OR 2: CPT

## 2017-04-27 PROCEDURE — 72170 X-RAY EXAM OF PELVIS: CPT

## 2017-04-27 PROCEDURE — 74011000258 HC RX REV CODE- 258: Performed by: INTERNAL MEDICINE

## 2017-04-27 PROCEDURE — 74011250637 HC RX REV CODE- 250/637: Performed by: HOSPITALIST

## 2017-04-27 PROCEDURE — 73020 X-RAY EXAM OF SHOULDER: CPT

## 2017-04-27 PROCEDURE — 77030011943

## 2017-04-27 PROCEDURE — 99285 EMERGENCY DEPT VISIT HI MDM: CPT

## 2017-04-27 PROCEDURE — 72100 X-RAY EXAM L-S SPINE 2/3 VWS: CPT

## 2017-04-27 PROCEDURE — 87040 BLOOD CULTURE FOR BACTERIA: CPT

## 2017-04-27 PROCEDURE — 65270000029 HC RM PRIVATE

## 2017-04-27 PROCEDURE — 81001 URINALYSIS AUTO W/SCOPE: CPT | Performed by: HOSPITALIST

## 2017-04-27 PROCEDURE — 74011250637 HC RX REV CODE- 250/637: Performed by: INTERNAL MEDICINE

## 2017-04-27 PROCEDURE — 81015 MICROSCOPIC EXAM OF URINE: CPT

## 2017-04-27 PROCEDURE — 87641 MR-STAPH DNA AMP PROBE: CPT | Performed by: HOSPITALIST

## 2017-04-27 PROCEDURE — 85025 COMPLETE CBC W/AUTO DIFF WBC: CPT

## 2017-04-27 PROCEDURE — 77030018798 HC PMP KT ENTRL FED COVD -A

## 2017-04-27 RX ORDER — FACIAL-BODY WIPES
10 EACH TOPICAL DAILY PRN
Status: DISCONTINUED | OUTPATIENT
Start: 2017-04-27 | End: 2017-04-28 | Stop reason: HOSPADM

## 2017-04-27 RX ORDER — ACETAMINOPHEN 325 MG/1
650 TABLET ORAL
Status: DISCONTINUED | OUTPATIENT
Start: 2017-04-27 | End: 2017-04-27 | Stop reason: SDUPTHER

## 2017-04-27 RX ORDER — VANCOMYCIN HYDROCHLORIDE
1250 ONCE
Status: COMPLETED | OUTPATIENT
Start: 2017-04-27 | End: 2017-04-27

## 2017-04-27 RX ORDER — ENOXAPARIN SODIUM 100 MG/ML
30 INJECTION SUBCUTANEOUS EVERY 24 HOURS
Status: DISCONTINUED | OUTPATIENT
Start: 2017-04-27 | End: 2017-04-28 | Stop reason: HOSPADM

## 2017-04-27 RX ORDER — SODIUM CHLORIDE 0.9 % (FLUSH) 0.9 %
5-10 SYRINGE (ML) INJECTION AS NEEDED
Status: DISCONTINUED | OUTPATIENT
Start: 2017-04-27 | End: 2017-04-28 | Stop reason: HOSPADM

## 2017-04-27 RX ORDER — SODIUM CHLORIDE 9 MG/ML
75 INJECTION, SOLUTION INTRAVENOUS CONTINUOUS
Status: DISCONTINUED | OUTPATIENT
Start: 2017-04-27 | End: 2017-04-28

## 2017-04-27 RX ORDER — GUAIFENESIN 100 MG/5ML
400 SOLUTION ORAL
COMMUNITY

## 2017-04-27 RX ORDER — SUCRALFATE 1 G/10ML
1 SUSPENSION ORAL
Status: DISCONTINUED | OUTPATIENT
Start: 2017-04-27 | End: 2017-04-28 | Stop reason: HOSPADM

## 2017-04-27 RX ORDER — FACIAL-BODY WIPES
10 EACH TOPICAL DAILY
Status: DISCONTINUED | OUTPATIENT
Start: 2017-04-27 | End: 2017-04-28 | Stop reason: HOSPADM

## 2017-04-27 RX ORDER — HYDROCODONE BITARTRATE AND ACETAMINOPHEN 10; 325 MG/1; MG/1
1 TABLET ORAL 2 TIMES DAILY
Status: ON HOLD | COMMUNITY
End: 2017-04-28

## 2017-04-27 RX ORDER — ONDANSETRON 2 MG/ML
4 INJECTION INTRAMUSCULAR; INTRAVENOUS
Status: DISCONTINUED | OUTPATIENT
Start: 2017-04-27 | End: 2017-04-28 | Stop reason: HOSPADM

## 2017-04-27 RX ORDER — DEXTROSE MONOHYDRATE AND SODIUM CHLORIDE 5; .9 G/100ML; G/100ML
100 INJECTION, SOLUTION INTRAVENOUS CONTINUOUS
Status: DISCONTINUED | OUTPATIENT
Start: 2017-04-27 | End: 2017-04-27

## 2017-04-27 RX ORDER — ASPIRIN 325 MG
325 TABLET ORAL DAILY
Status: DISCONTINUED | OUTPATIENT
Start: 2017-04-27 | End: 2017-04-28 | Stop reason: HOSPADM

## 2017-04-27 RX ORDER — NALOXONE HYDROCHLORIDE 0.4 MG/ML
0.4 INJECTION, SOLUTION INTRAMUSCULAR; INTRAVENOUS; SUBCUTANEOUS AS NEEDED
Status: DISCONTINUED | OUTPATIENT
Start: 2017-04-27 | End: 2017-04-28 | Stop reason: HOSPADM

## 2017-04-27 RX ORDER — BACLOFEN 10 MG/1
10 TABLET ORAL 2 TIMES DAILY
Status: DISCONTINUED | OUTPATIENT
Start: 2017-04-27 | End: 2017-04-28 | Stop reason: HOSPADM

## 2017-04-27 RX ORDER — TAMSULOSIN HYDROCHLORIDE 0.4 MG/1
0.4 CAPSULE ORAL DAILY
Status: DISCONTINUED | OUTPATIENT
Start: 2017-04-27 | End: 2017-04-28 | Stop reason: HOSPADM

## 2017-04-27 RX ORDER — MORPHINE SULFATE 100 MG/5ML
5 SOLUTION ORAL
Status: DISCONTINUED | OUTPATIENT
Start: 2017-04-27 | End: 2017-04-28 | Stop reason: HOSPADM

## 2017-04-27 RX ORDER — MORPHINE SULFATE 20 MG/5ML
5 SOLUTION ORAL
Status: ON HOLD | COMMUNITY
End: 2017-04-28

## 2017-04-27 RX ORDER — SODIUM CHLORIDE 0.9 % (FLUSH) 0.9 %
5-10 SYRINGE (ML) INJECTION EVERY 8 HOURS
Status: DISCONTINUED | OUTPATIENT
Start: 2017-04-27 | End: 2017-04-28 | Stop reason: HOSPADM

## 2017-04-27 RX ORDER — ACETAMINOPHEN 325 MG/1
650 TABLET ORAL
Status: DISCONTINUED | OUTPATIENT
Start: 2017-04-27 | End: 2017-04-28 | Stop reason: HOSPADM

## 2017-04-27 RX ORDER — NITROFURANTOIN 25; 75 MG/1; MG/1
100 CAPSULE ORAL 2 TIMES DAILY
COMMUNITY
End: 2017-04-28

## 2017-04-27 RX ORDER — VANCOMYCIN HYDROCHLORIDE
1250 EVERY 24 HOURS
Status: DISCONTINUED | OUTPATIENT
Start: 2017-04-28 | End: 2017-04-27

## 2017-04-27 RX ORDER — MAG HYDROX/ALUMINUM HYD/SIMETH 200-200-20
30 SUSPENSION, ORAL (FINAL DOSE FORM) ORAL
Status: DISCONTINUED | OUTPATIENT
Start: 2017-04-27 | End: 2017-04-28 | Stop reason: HOSPADM

## 2017-04-27 RX ORDER — ONDANSETRON 4 MG/1
4 TABLET, FILM COATED ORAL
COMMUNITY

## 2017-04-27 RX ADMIN — ASPIRIN 325 MG ORAL TABLET 325 MG: 325 PILL ORAL at 10:05

## 2017-04-27 RX ADMIN — SUCRALFATE 1 G: 1 SUSPENSION ORAL at 10:10

## 2017-04-27 RX ADMIN — MORPHINE SULFATE 5 MG: 100 SOLUTION ORAL at 22:31

## 2017-04-27 RX ADMIN — SUCRALFATE 1 G: 1 SUSPENSION ORAL at 22:53

## 2017-04-27 RX ADMIN — ONDANSETRON 4 MG: 2 INJECTION INTRAMUSCULAR; INTRAVENOUS at 10:12

## 2017-04-27 RX ADMIN — SODIUM CHLORIDE 75 ML/HR: 900 INJECTION, SOLUTION INTRAVENOUS at 21:36

## 2017-04-27 RX ADMIN — Medication 10 ML: at 10:13

## 2017-04-27 RX ADMIN — SODIUM CHLORIDE 1716 ML: 900 INJECTION, SOLUTION INTRAVENOUS at 03:07

## 2017-04-27 RX ADMIN — SODIUM CHLORIDE 1000 ML: 900 INJECTION, SOLUTION INTRAVENOUS at 04:22

## 2017-04-27 RX ADMIN — ACETAMINOPHEN 650 MG: 325 TABLET ORAL at 18:05

## 2017-04-27 RX ADMIN — SODIUM CHLORIDE 125 ML/HR: 900 INJECTION, SOLUTION INTRAVENOUS at 10:20

## 2017-04-27 RX ADMIN — Medication 10 ML: at 10:14

## 2017-04-27 RX ADMIN — PIPERACILLIN SODIUM,TAZOBACTAM SODIUM 4.5 G: 4; .5 INJECTION, POWDER, FOR SOLUTION INTRAVENOUS at 03:07

## 2017-04-27 RX ADMIN — BACLOFEN 10 MG: 10 TABLET ORAL at 18:05

## 2017-04-27 RX ADMIN — CEFTRIAXONE 1 G: 1 INJECTION, POWDER, FOR SOLUTION INTRAMUSCULAR; INTRAVENOUS at 10:05

## 2017-04-27 RX ADMIN — SUCRALFATE 1 G: 1 SUSPENSION ORAL at 18:04

## 2017-04-27 RX ADMIN — BACLOFEN 10 MG: 10 TABLET ORAL at 10:09

## 2017-04-27 RX ADMIN — BISACODYL 10 MG: 10 SUPPOSITORY RECTAL at 10:06

## 2017-04-27 RX ADMIN — Medication 10 ML: at 22:46

## 2017-04-27 RX ADMIN — ENOXAPARIN SODIUM 30 MG: 30 INJECTION SUBCUTANEOUS at 10:08

## 2017-04-27 RX ADMIN — VANCOMYCIN HYDROCHLORIDE 1250 MG: 10 INJECTION, POWDER, LYOPHILIZED, FOR SOLUTION INTRAVENOUS at 03:36

## 2017-04-27 NOTE — ROUTINE PROCESS
TRANSFER - OUT REPORT:    Verbal report given to Luke(name) on Bryant Fret  being transferred to 616(unit) for routine progression of care       Report consisted of patients Situation, Background, Assessment and   Recommendations(SBAR). Information from the following report(s) SBAR was reviewed with the receiving nurse. Lines:   Peripheral IV 04/27/17 Left Forearm (Active)       Peripheral IV 04/27/17 Right Antecubital (Active)        Opportunity for questions and clarification was provided.       Patient transported with:   O2 @ 2 liters

## 2017-04-27 NOTE — PROGRESS NOTES
Hospitalist Follow Up Note     Admit Date:  2017  2:18 AM   Name:  Lory Yanes   Age:  80 y.o.  :  2/10/1924   MRN:  [id number]   PCP:  Teetee Moore MD  Treatment Team: Attending Provider: Elvin Wu MD    Patient was admitted after midnight today. This is a follow up to the H&P.     Patient Vitals for the past 24 hrs:   Temp Pulse Resp BP SpO2   17 0630 - 89 22 148/76 95 %   17 0530 - 66 24 90/52 96 %   17 0500 99 °F (37.2 °C) 75 13 91/50 100 %   17 0450 - 69 14 (!) 88/51 99 %   177 - 66 13 90/49 99 %   17 0440 - 68 14 (!) 80/46 98 %   17 0430 - 68 14 (!) 75/44 100 %   17 0424 - 73 15 (!) 81/42 100 %   17 0421 - 72 15 (!) 80/45 100 %   17 0419 - 77 16 (!) 79/42 99 %   17 0413 - 66 16 (!) 75/38 98 %   17 0412 - 66 16 - 98 %   17 0411 - 67 15 - 99 %   17 0410 - 66 16 (!) 73/38 99 %   17 0400 - 78 15 (!) 84/44 100 %   17 0350 - 70 17 96/50 98 %   17 0341 - 79 17 (!) 85/50 96 %   17 0330 - 76 14 92/50 98 %   17 0320 - 85 17 95/47 97 %   17 0310 - 75 - 99/48 98 %   17 0303 - 82 15 - 96 %   17 0302 - 84 - 90/48 98 %   17 0250 - 71 14 (!) 86/48 100 %   17 0247 - - - - 95 %   17 0240 - 76 13 91/45 99 %   17 0232 - 83 - 99/50 95 %   17 0223 - 80 17 (!) 85 96 %   17 99.4 °F (37.4 °C) 77 16 (!) 85/43 95 %     Oxygen Therapy  O2 Sat (%): 95 % (17 0630)  Pulse via Oximetry: 69 beats per minute (17 0530)  O2 Device: Nasal cannula (17 0500)  O2 Flow Rate (L/min): 2 l/min (17 0500)  No intake or output data in the 24 hours ending 17 0804      Current Facility-Administered Medications   Medication Dose Route Frequency    sodium chloride (NS) flush 5-10 mL  5-10 mL IntraVENous PRN    aspirin (ASPIRIN) tablet 325 mg  325 mg PEG Tube DAILY    alum-mag hydroxide-simeth (MYLANTA) oral suspension 30 mL  30 mL Per G Tube Q4H PRN    sucralfate (CARAFATE) 100 mg/mL oral suspension 1 g  1 g Oral AC&HS    tamsulosin (FLOMAX) capsule 0.4 mg  0.4 mg Oral DAILY    baclofen (LIORESAL) tablet 10 mg  10 mg Oral BID    bisacodyl (DULCOLAX) suppository 10 mg  10 mg Rectal DAILY    acetaminophen (TYLENOL) tablet 650 mg  650 mg Oral Q6H PRN    sodium chloride (NS) flush 5-10 mL  5-10 mL IntraVENous Q8H    sodium chloride (NS) flush 5-10 mL  5-10 mL IntraVENous PRN    naloxone (NARCAN) injection 0.4 mg  0.4 mg IntraVENous PRN    ondansetron (ZOFRAN) injection 4 mg  4 mg IntraVENous Q4H PRN    bisacodyl (DULCOLAX) suppository 10 mg  10 mg Rectal DAILY PRN    enoxaparin (LOVENOX) injection 30 mg  30 mg SubCUTAneous Q24H    0.9% sodium chloride infusion  125 mL/hr IntraVENous CONTINUOUS    cefTRIAXone (ROCEPHIN) 1 g in 0.9% sodium chloride (MBP/ADV) 50 mL  1 g IntraVENous Q24H       CT Results  (Last 48 hours)    None        CXR Results  (Last 48 hours)               04/27/17 0247  XR CHEST PORT Final result    Impression:  IMPRESSION:       Unchanged cardiomegaly with bilateral interstitial prominence, this could be   chronic given similar appearance to previous exam although cannot exclude mild   vascular congestion or atypical infectious process. Likely small bilateral pleural effusions with adjacent atelectasis/infiltrate. Narrative:      Exam: Single view of the chest       Indication: Altered mental status       Comparison: Chest radiograph from March 3, 2017       Findings:    The patient is rotated. Cardiac silhouette is enlarged but unchanged from   previous exam. Left cardiac pacer is in unchanged position. Nonspecific   bilateral interstitial prominence similar to prior exam. Suspect small bilateral   pleural effusions with overlying atelectasis/infiltrate. No evidence of   pneumothorax. No acute osseous abnormality.                     Hospital Problems as of 4/27/2017  Never Reviewed Codes Class Noted - Resolved POA    Acute cystitis ICD-10-CM: N30.00  ICD-9-CM: 595.0  4/27/2017 - Present Yes        Hypotension ICD-10-CM: I95.9  ICD-9-CM: 458.9  4/27/2017 - Present Yes        * (Principal)Sepsis due to urinary tract infection ([facility] [address] ICD-10-CM: A41.9, N39.0  ICD-9-CM: 038.9, 995.91, 599.0  4/27/2017 - Present Yes        Altered mental status, unspecified ICD-10-CM: R41.82  ICD-9-CM: 780.97  4/27/2017 - Present Yes        Pain in back ICD-10-CM: M54.9  ICD-9-CM: 724.5  4/27/2017 - Present Yes        Pain in left knee ICD-10-CM: M25.562  ICD-9-CM: 719.46  4/27/2017 - Present Yes        History of CVA (cerebrovascular accident) (Chronic) ICD-10-CM: Z86.73  ICD-9-CM: V12.54  4/27/2017 - Present Yes        Dehydration ICD-10-CM: E86.0  ICD-9-CM: 276.51  4/27/2017 - Present Yes        Anemia (Chronic) ICD-10-CM: D64.9  ICD-9-CM: 285.9  4/27/2017 - Present Yes              PLAN:  · Agree with plan as documented in H&P with following comments/changes:  · Change abx to rocephin  · PT/OT evals  · Change IVF to NS      Signed:  Leila Michaud MD

## 2017-04-27 NOTE — PROGRESS NOTES
Problem: Nutrition Deficit  Goal: *Optimize nutritional status  Nutrition:  Reason for assessment: Consult for TF management per nutritional support protocols. (Dr Amy Perales)   Assessment:   Diet order: Regular  Food/Nutrition History: GT in place,  Abdomen soft with active bowel sounds. Date of Last BM: 4-23. Pt seen in company of 3 daughters and 2 granddaughter. They reports ever since the GT was placed after her stroke in December 2016, she has had problems with intermittent, unpredictable vomiting large amounts of what looks like undigested TF formula. This issue had been better lately but she has had vomiting of some greenish emesis since admission. They say that the reason for the vomiting has yet to be discovered, but she has pretty much been on antibiotics since the GT placement and think it may somehow be related to that. Her feeding was also changed for a 12 hr infusion to 18 hrs then to 20 hrs. She typically has regular BM's, no diarrhea or constipation. She had also recently been started on purreed diet with honey thick liquids for pleasure and only takes bites at best.   [facility] TF per MAR: Osmolite 1.2 at 70 ml/hr with 125 ml water every 4 hr. Pertinent Medications: Carafate, Zofran, Rocephin, IVF: NS at 125 ml/hr  Pertinent labs: BMP glucose 141 mg/dl-not noted hx of DM. Pt admitted with UTI, possible sepsis  Anthropometrics:Height: 5' 3\" (160 cm),  Weight: 57.2 kg (126 lb)-unknown source, Body mass index is 22.32 kg/(m^2). BMI class of underweight. Weight range per [facility] MAR: 121-125#. Hx of CHF. Macronutrient needs:  EER:  6507-4159 kcal /day (25-30 kcal/kg listed BW)  EPR:  63-78 grams protein/day (1.2-1.5 grams/kg IBW)  Max CHO:  236 grams/day (55% kcal)  Fluid:  1ml/kcal  Intake/Comparative standards: Current NPO status does not meet kcal or protein needs     Nutrition Diagnosis: Difficulty swallowing r/t dysphagia as evidenced by TF dependent PTA.       Intervention:  Meals and snacks: NPO  EN:Start TF of Osmolite 1.2 at 55 ml/hr with 25ml water q 1hr to provide 1584 kcal/day (100% of needs), 73 grams protein/day (100% of needs), 209 grams CHO/day (does not exceed max CHO),  and ~ 1670ml free water/day (100% of needs). Nutrition Supplement Therapy: Electrolyte replacement per nutritional support protocols are active on MAR. IV: Decrease IVF to 45 ml/hr once TF is started.      Sindi Nicholas, [address], [address] [place] [address]

## 2017-04-27 NOTE — ED TRIAGE NOTES
Brought in via EMS. States facility has noted pt increasingly weak and altered over past 2 days. Temp 101.5 with EMS. Pt wear 2L NC O2 at all times. Pt alert to painful stimuli. Pt normally alert and oriented x 4 per EMS. EMS administered 1L NC. . Pt has chronic a-fib.

## 2017-04-27 NOTE — ED PROVIDER NOTES
HPI Comments: 80-year-old female brought to the ED by EMS for  Altered mental status and fever. This EMS does not do sepsis protocol. ppatient has a history of recurrent UTIs and from the nursing home it appears that she is on Levaquin at this time. Although Bactrim and Macrobid are both listed his meds on her list  We'll try to  Clarify which one she is on. Patient is on hospice and DNR. Patient is a 80 y.o. female presenting with altered mental status. The history is provided by the EMS personnel, a relative and the nursing home. Altered mental status    This is a recurrent problem. The current episode started 12 to 24 hours ago. The problem has been gradually worsening. Associated symptoms include confusion, somnolence and unresponsiveness. Past Medical History:   Diagnosis Date    hyperlipidemia     Hypertension        History reviewed. No pertinent surgical history. History reviewed. No pertinent family history. Social History     Social History    Marital status:      Spouse name: N/A    Number of children: N/A    Years of education: N/A     Occupational History    Not on file. Social History Main Topics    Smoking status: Unknown If Ever Smoked    Smokeless tobacco: Not on file    Alcohol use Not on file    Drug use: Not on file    Sexual activity: Not on file     Other Topics Concern    Not on file     Social History Narrative         ALLERGIES: Codeine; Demerol [meperidine]; Novocain [procaine]; and Stadol [butorphanol tartrate]    Review of Systems   Constitutional: Negative. Negative for activity change. HENT: Negative. Eyes: Negative. Respiratory: Negative. Cardiovascular: Negative. Gastrointestinal: Negative. Genitourinary: Negative. Musculoskeletal: Negative. Skin: Negative. Neurological: Negative. Psychiatric/Behavioral: Positive for confusion. All other systems reviewed and are negative.       Vitals:    04/27/17 0222 04/27/17 0232 04/27/17 0247   BP: (!) 85/43 99/50    Pulse: 77 83    Resp: 16     Temp: 99.4 °F (37.4 °C)     SpO2: 95% 95% 95%   Weight: 57.2 kg (126 lb)     Height: 5' 3\" (1.6 m)              Physical Exam   Constitutional: She is oriented to person, place, and time. She appears well-developed and well-nourished. No distress. HENT:   Head: Normocephalic and atraumatic. Right Ear: External ear normal.   Left Ear: External ear normal.   Nose: Nose normal.   Mouth/Throat: Oropharynx is clear and moist. No oropharyngeal exudate. Eyes: Conjunctivae and EOM are normal. Pupils are equal, round, and reactive to light. Right eye exhibits no discharge. Left eye exhibits no discharge. No scleral icterus. Neck: Normal range of motion. Neck supple. No JVD present. No tracheal deviation present. Cardiovascular: Normal rate, regular rhythm and intact distal pulses. Pulmonary/Chest: Effort normal and breath sounds normal. No stridor. No respiratory distress. She has no wheezes. She exhibits no tenderness. Abdominal: Soft. Bowel sounds are normal. She exhibits no distension and no mass. There is no tenderness. Musculoskeletal: Normal range of motion. She exhibits no edema or tenderness. Neurological: She is alert and oriented to person, place, and time. No cranial nerve deficit. Skin: Skin is warm and dry. No rash noted. She is not diaphoretic. No erythema. No pallor. Psychiatric: She has a normal mood and affect. Her behavior is normal. Thought content normal.   Nursing note and vitals reviewed. MDM  Number of Diagnoses or Management Options  Transient alteration of awareness: new and requires workup  Urinary tract infection without hematuria, site unspecified: new and requires workup  Diagnosis management comments: Patient's mental status improved with fluids however blood pressure remained low. She responded to painful stimulus. She did look at her family.        Amount and/or Complexity of Data Reviewed  Clinical lab tests: ordered and reviewed  Tests in the radiology section of CPT®: ordered and reviewed  Tests in the medicine section of CPT®: ordered and reviewed    Risk of Complications, Morbidity, and/or Mortality  Presenting problems: high  Diagnostic procedures: high  Management options: high      ED Course       Procedures

## 2017-04-27 NOTE — PROGRESS NOTES
Dual skin assessment done by Dev Rodriguez RN, and STEPHANIE Newberry, no redness, open areas, skin intact.

## 2017-04-27 NOTE — PROGRESS NOTES
OT NOTE:    OT order received, chart reviewed. Pt off floor 1st attempt; 2nd attempt, pt vomiting. OT will attempt evaluation at a later time/date as schedule permits.   Thank you,    [place] MS, OTR/L  04/27/2017

## 2017-04-27 NOTE — H&P
HOSPITALIST H&P/CONSULT  NAME:  Vane Freire   Age:  80 y.o.  :   2/10/1924   MRN:   [id number]  PCP: Pratima Donato MD  Treatment Team: Attending Provider: Thuy Valdez MD    No Order     CC: Reason for admission is: UTI, possible sepsis, AMS    HPI:   Patient case was reviewed with the ER provider prior to seeing the patient. Patient is a 80 y.o. female who presents to the ER from [facility] with decreased LOC. Pt has hx CVA Dec 2016 and resultant left hemiparesis and dysphagia. PEG tube for feeding. Accompanied by family members. History of UTI. Douglas Osier out of bed the other day and has been indicating severe back pain. Family states xrays were performed but they do not know the results. They note pt's left knee is swollen and has had pain in right shoulder. Pt currently difficult to arouse. No N/V. No resp symptoms. Pt is hypotensive and appears dehydrated. She is on hospice and has DNR directives. Has been on different abx lately including bactrim, macrobid and levaquin. Vancomycin and zosyn were started in the ED. ROS:  All systems have been reviewed and are negative except as stated in HPI or elsewhere. Past Medical History:   Diagnosis Date    hyperlipidemia     Hypertension       History reviewed. No pertinent surgical history. Social History   Substance Use Topics    Smoking status: Unknown If Ever Smoked    Smokeless tobacco: Not on file    Alcohol use Not on file      History reviewed. No pertinent family history. FH Reviewed and non-contributory to admitting diagnosis    Allergies   Allergen Reactions    Codeine Other (comments)     Passes out    Demerol [Meperidine] Hives    Novocain [Procaine] Other (comments)     Passes out    Stadol [Butorphanol Tartrate] Hives      Prior to Admission Medications   Prescriptions Last Dose Informant Patient Reported?  Taking?   acetaminophen (TYLENOL) 325 mg tablet   Yes No   Si mg by PEG Tube route every six (6) hours as needed for Pain. alum-mag hydroxide-simeth (MYLANTA) 200-200-20 mg/5 mL susp   Yes No   Si mL by PEG Tube route every four (4) hours as needed. aspirin (ASPIRIN) 325 mg tablet   Yes No   Si mg by PEG Tube route daily. atorvastatin (LIPITOR) 40 mg tablet   Yes No   Si mg by PEG Tube route daily. baclofen (LIORESAL) 10 mg tablet   Yes No   Sig: 10 mg by PEG Tube route two (2) times a day. bisacodyl (DULCOLAX, BISACODYL,) 10 mg suppository   Yes No   Sig: Insert 10 mg into rectum daily. cetirizine (ZYRTEC) 10 mg tablet   Yes No   Sig: 10 mg by PEG Tube route nightly.   gabapentin (NEURONTIN) 300 mg capsule   Yes No   Si mg by PEG Tube route nightly. lisinopril (PRINIVIL, ZESTRIL) 2.5 mg tablet   Yes No   Si.5 mg by PEG Tube route daily. magnesium hydroxide (SORIA MILK OF MAGNESIA) 400 mg/5 mL suspension   Yes No   Sig: by PEG Tube route daily. metoprolol succinate (TOPROL-XL) 25 mg XL tablet   Yes No   Si.5 mg by PEG Tube route two (2) times a day. sucralfate (CARAFATE) 1 gram tablet   Yes No   Si g by PEG Tube route four (4) times daily. tamsulosin (FLOMAX) 0.4 mg capsule   Yes No   Si.4 mg by PEG Tube route daily. traMADol (ULTRAM) 5 mg/mL susp 5 mg/mL oral suspension (compounded)   No No   Sig: Take 10 mL by mouth every six (6) hours as needed. Max Daily Amount: 200 mg. Facility-Administered Medications: None         Objective:     Visit Vitals    BP 90/52    Pulse 66    Temp 99 °F (37.2 °C)    Resp 24    Ht 5' 3\" (1.6 m)    Wt 57.2 kg (126 lb)    SpO2 96%    BMI 22.32 kg/m2      Temp (24hrs), Av.2 °F (37.3 °C), Min:99 °F (37.2 °C), Max:99.4 °F (37.4 °C)    Oxygen Therapy  O2 Sat (%): 96 % (17)  Pulse via Oximetry: 69 beats per minute (17)  O2 Device: Nasal cannula (17)  O2 Flow Rate (L/min): 2 l/min (17)   Body mass index is 22.32 kg/(m^2).     Physical Exam:    General: Chronically ill appearing   Head:   Normocephalic, without obvious abnormality, atraumatic. Eyes:  Eyes closed  ENT:             Oropharynx is clear with dry mucous membranes  No stridor  Resp:    Clear to auscultation bilaterally. No Wheezing or Rhonchi. Resp are even and unlabored  Heart[de-identified]  Regular rate and rhythm,  no murmur, rub or gallop. No LE edema  Abdomen:   Soft, non-tender. Not distended. Bowel sounds normal.  PEG in place, no erythema at insertion site  Musc/SK: Atrophied limbs  Skin:     Pale, diminished turgor  Neurologic: Not responsive, left arm and leg without spontaneous movement. Limbs stiff  Psych: Not responsive     Data Review:   Recent Results (from the past 24 hour(s))   EKG, 12 LEAD, INITIAL    Collection Time: 04/27/17  2:21 AM   Result Value Ref Range    Ventricular Rate 85 BPM    Atrial Rate 131 BPM    QRS Duration 132 ms    Q-T Interval 380 ms    QTC Calculation (Bezet) 452 ms    Calculated R Axis 80 degrees    Calculated T Axis 12 degrees    Diagnosis       !! AGE AND GENDER SPECIFIC ECG ANALYSIS !! Atrial fibrillation with premature ventricular or aberrantly conducted   complexes  Right bundle branch block  Abnormal ECG  No previous ECGs available     CBC WITH AUTOMATED DIFF    Collection Time: 04/27/17  2:30 AM   Result Value Ref Range    WBC 11.1 4.3 - 11.1 K/uL    RBC 3.09 (L) 4.05 - 5.25 M/uL    HGB 9.2 (L) 11.7 - 15.4 g/dL    HCT 27.7 (L) 35.8 - 46.3 %    MCV 89.6 79.6 - 97.8 FL    MCH 29.8 26.1 - 32.9 PG    MCHC 33.2 31.4 - 35.0 g/dL    RDW 13.5 11.9 - 14.6 %    PLATELET 306 057 - 532 K/uL    MPV 10.1 (L) 10.8 - 14.1 FL    DF AUTOMATED      NEUTROPHILS 84 (H) 43 - 78 %    LYMPHOCYTES 7 (L) 13 - 44 %    MONOCYTES 7 4.0 - 12.0 %    EOSINOPHILS 1 0.5 - 7.8 %    BASOPHILS 0 0.0 - 2.0 %    IMMATURE GRANULOCYTES 0.6 0.0 - 5.0 %    ABS. NEUTROPHILS 9.3 (H) 1.7 - 8.2 K/UL    ABS. LYMPHOCYTES 0.8 0.5 - 4.6 K/UL    ABS. MONOCYTES 0.8 0.1 - 1.3 K/UL    ABS.  EOSINOPHILS 0.1 0.0 - 0.8 K/UL    ABS. BASOPHILS 0.0 0.0 - 0.2 K/UL    ABS. IMM. GRANS. 0.1 0.0 - 0.5 K/UL   METABOLIC PANEL, COMPREHENSIVE    Collection Time: 04/27/17  2:30 AM   Result Value Ref Range    Sodium 142 136 - 145 mmol/L    Potassium 4.0 3.5 - 5.1 mmol/L    Chloride 102 98 - 107 mmol/L    CO2 36 (H) 21 - 32 mmol/L    Anion gap 4 (L) 7 - 16 mmol/L    Glucose 141 (H) 65 - 100 mg/dL    BUN 20 8 - 23 MG/DL    Creatinine 0.55 (L) 0.6 - 1.0 MG/DL    GFR est AA >60 >60 ml/min/1.73m2    GFR est non-AA >60 >60 ml/min/1.73m2    Calcium 8.2 (L) 8.3 - 10.4 MG/DL    Bilirubin, total 0.3 0.2 - 1.1 MG/DL    ALT (SGPT) 15 12 - 65 U/L    AST (SGOT) 11 (L) 15 - 37 U/L    Alk. phosphatase 91 50 - 136 U/L    Protein, total 5.5 (L) 6.3 - 8.2 g/dL    Albumin 2.1 (L) 3.2 - 4.6 g/dL    Globulin 3.4 2.3 - 3.5 g/dL    A-G Ratio 0.6 (L) 1.2 - 3.5     PROCALCITONIN    Collection Time: 04/27/17  2:30 AM   Result Value Ref Range    Procalcitonin <0.1 ng/mL   POC LACTIC ACID    Collection Time: 04/27/17  2:36 AM   Result Value Ref Range    Lactic Acid (POC) 1.2 0.5 - 1.9 mmol/L   URINE MICROSCOPIC    Collection Time: 04/27/17  2:55 AM   Result Value Ref Range    WBC 20-50 0 /hpf    RBC 0-3 0 /hpf    Epithelial cells 0 0 /hpf    Bacteria 4+ (H) 0 /hpf    Casts 0-3 0 /lpf     CXR Results  (Last 48 hours)               04/27/17 0247  XR CHEST PORT Final result    Impression:  IMPRESSION:       Unchanged cardiomegaly with bilateral interstitial prominence, this could be   chronic given similar appearance to previous exam although cannot exclude mild   vascular congestion or atypical infectious process. Likely small bilateral pleural effusions with adjacent atelectasis/infiltrate. Narrative:      Exam: Single view of the chest       Indication: Altered mental status       Comparison: Chest radiograph from March 3, 2017       Findings:    The patient is rotated.  Cardiac silhouette is enlarged but unchanged from   previous exam. Left cardiac pacer is in unchanged position. Nonspecific   bilateral interstitial prominence similar to prior exam. Suspect small bilateral   pleural effusions with overlying atelectasis/infiltrate. No evidence of   pneumothorax. No acute osseous abnormality. CT Results  (Last 48 hours)    None          Assessment and Plan: Active Hospital Problems    Diagnosis Date Noted    Acute cystitis 04/27/2017    Hypotension 04/27/2017    Sepsis ([facility] [address] 04/27/2017    Altered mental status, unspecified 04/27/2017    Pain in back 04/27/2017    Pain in left knee 04/27/2017    History of CVA (cerebrovascular accident) 04/27/2017    Dehydration 04/27/2017    Anemia 04/27/2017         · PLAN   · Admit inpatient  · IV abx (vanco/zosyn), IVF  · PEG tube feedings, nutrition consult  · lovenox for DVT prophylaxis  · Xray right shoulder, lumbar spine, pelvis and left knee  · Pt has DNR directives  · Hold sedating meds to see if pt will wake  · Cont appropriate home meds (see MAR)  · Control symptoms (pain, n/v, fever, etc)  · Monitor appropriate labs   · Plans discussed with patient and/or caregiver; questions answered.         Signed By: Susan Sequeira MD     April 27, 2017

## 2017-04-27 NOTE — PROGRESS NOTES
Problem: Mobility Impaired (Adult and Pediatric)  Goal: *Acute Goals and Plan of Care (Insert Text)  LTG:  (1.)Ms. Stacy Elder will roll side to side in bed with MAXIMAL ASSIST within 7 day(s). (2.)Ms. Stacy Elder will transfer supine to sitting with TOTAL ASSIST <> within 7 days. (3.)Ms. Stacy Elder will maintain static/dynamic sitting x 15 minutes with CGA for improved balance within 7 days. ________________________________________________________________________________________________      PHYSICAL THERAPY: INITIAL ASSESSMENT, PM 4/27/2017  INPATIENT: Hospital Day: 1  Payor: Claudia  / Plan: [address] [place] [address] HMO / Product Type: [employer] /      NAME/AGE/GENDER: Erick Diop is a 80 y.o. female            PRIMARY DIAGNOSIS: Acute cystitis Sepsis due to urinary tract infection ([facility] [address] Sepsis due to urinary tract infection ([facility] [address]        ICD-10: Treatment Diagnosis:       · Generalized Muscle Weakness (M62.81)  · Difficulty in walking, Not elsewhere classified (R26.2)  · Other abnormalities of gait and mobility (R26.89)  · History of falling (Z91.81)   Precaution/Allergies:  Codeine; Demerol [meperidine]; Novocain [procaine]; and Stadol [butorphanol tartrate]       ASSESSMENT:      Ms. Stacy Elder is a poorly 80 y.o. female admitted to the hospital for the above from [facility]. Per family, who were present, pt has been getting mobilized via alyson lift and is mostly bed bound. Pt was talkative throughout but speech was mostly unintelligible. She also appears confused and tearful with movement of B LEs. Pt has grossly decreased AROM, PROM and strength in B LEs. Pt had a stroke in December of 2016 and has been declining ever since. She also has L increased weakness reportedly from CVA. Pt observed to have L knee swelling and no AROM of L LE other than toe flexion/extension. She has some R ankle dorsiflexion AROM but otherwise flaccid legs.   Pt appears to be locked in B knee extension and it is difficult to tell if this is the result of tone, guarding or contracture. Abnormal tone also left in R hip adductors. Pt cried out in pain when rolling was attempted with total assist.  PT will attempt on a trial basis but family has expressed desire for possible comfort care. This section established at most recent assessment   PROBLEM LIST (Impairments causing functional limitations):  1. Decreased Strength  2. Decreased Transfer Abilities  3. Decreased Ambulation Ability/Technique  4. Decreased Flexibility/Joint Mobility  5. Decreased Cognition    INTERVENTIONS PLANNED: (Benefits and precautions of physical therapy have been discussed with the patient.)  1. Balance Exercise  2. Bed Mobility  3. Family Education  4. Neuromuscular Re-education/Strengthening  5. Range of Motion (ROM)  6. Therapeutic Activites  7. Therapeutic Exercise/Strengthening      TREATMENT PLAN: Frequency/Duration: 2 times a week for one week trial  Rehabilitation Potential For Stated Goals: GUARDED      RECOMMENDED REHABILITATION/EQUIPMENT: (at time of discharge pending progress): Discussed with Case Management. HISTORY:   History of Present Injury/Illness (Reason for Referral):  Per H&P:   HPI:   Patient case was reviewed with the ER provider prior to seeing the patient. Patient is a 80 y.o. female who presents to the ER from [facility] with decreased LOC. Pt has hx CVA Dec 2016 and resultant left hemiparesis and dysphagia. PEG tube for feeding. Accompanied by family members. History of UTI. Castro Call out of bed the other day and has been indicating severe back pain. Family states xrays were performed but they do not know the results. They note pt's left knee is swollen and has had pain in right shoulder. Pt currently difficult to arouse. No N/V. No resp symptoms. Pt is hypotensive and appears dehydrated. She is on hospice and has DNR directives. Has been on different abx lately including bactrim, macrobid and levaquin. Vancomycin and zosyn were started in the ED. Past Medical History/Comorbidities:   Ms. Jelly Del Rio  has a past medical history of hyperlipidemia and Hypertension. Ms. Jelly Del Rio  has no past surgical history on file. Social History/Living Environment:   Home Environment: Long term care  One/Two Story Residence: One story  Living Alone: No  Support Systems: Skilled nursing facility  Patient Expects to be Discharged to[de-identified] Unknown  Current DME Used/Available at Home: Raheel Celeste  Prior Level of Function/Work/Activity:  See above      Number of Personal Factors/Comorbidities that affect the Plan of Care:  CVA  Confusion  History of falling 3+: HIGH COMPLEXITY   EXAMINATION:   Most Recent Physical Functioning:   Gross Assessment:  AROM: Grossly decreased, non-functional  PROM: Grossly decreased, non-functional  Strength: Grossly decreased, non-functional               Posture:     Balance:    Bed Mobility:  Rolling: Total assistance  Scooting: Total assistance  Interventions: Manual cues; Verbal cues; Visual cues; Tactile cues  Wheelchair Mobility:     Transfers:  Sit to Stand:  (Pt unable)  Gait:             Body Structures Involved:  1. Nerves  2. Endocrine  3. Joints  4. Muscles Body Functions Affected:  1. Mental  2. Voice and Speech  3. Genitourinary  4. Neuromusculoskeletal  5. Movement Related  6. Metobolic/Endocrine Activities and Participation Affected:  1. Learning and Applying Knowledge  2. General Tasks and Demands  3. Mobility  4. Self Care  5. Domestic Life  6. Community, Social and [place]   Number of elements that affect the Plan of Care: 4+: HIGH COMPLEXITY   CLINICAL PRESENTATION:   Presentation: Evolving clinical presentation with unstable and unpredictable characteristics: HIGH COMPLEXITY   CLINICAL DECISION MAKIN [facility] Mobility Inpatient Short Form  How much difficulty does the patient currently have. .. Unable A Lot A Little None   1.   Turning over in bed (including adjusting bedclothes, sheets and blankets)? [X] 1   [ ] 2   [ ] 3   [ ] 4   2. Sitting down on and standing up from a chair with arms ( e.g., wheelchair, bedside commode, etc.)   [X] 1   [ ] 2   [ ] 3   [ ] 4   3. Moving from lying on back to sitting on the side of the bed? [X] 1   [ ] 2   [ ] 3   [ ] 4   How much help from another person does the patient currently need. .. Total A Lot A Little None   4. Moving to and from a bed to a chair (including a wheelchair)? [X] 1   [ ] 2   [ ] 3   [ ] 4   5. Need to walk in hospital room? [X] 1   [ ] 2   [ ] 3   [ ] 4   6. Climbing 3-5 steps with a railing? [X] 1   [ ] 2   [ ] 3   [ ] 4   © 2007, Trustees of [address] [place] [address], under license to [employer]. All rights reserved    Score:  Initial: 6 Most Recent: X (Date: -- )     Interpretation of Tool:  Represents activities that are increasingly more difficult (i.e. Bed mobility, Transfers, Gait). Score 24 23 22-20 19-15 14-10 9-7 6       Modifier CH CI CJ CK CL CM CN         · Mobility - Walking and Moving Around:               - CURRENT STATUS:    CN - 100% impaired, limited or restricted               - GOAL STATUS:           CM - 80%-99% impaired, limited or restricted               - D/C STATUS:                       ---------------To be determined---------------  Payor: [employer] MEDICARE / Plan: [address] [place] [address] HMO / Product Type: Managed Care Medicare /       Medical Necessity:     · Patient demonstrates poor rehab potential due to higher previous functional level. Reason for Services/Other Comments:  · Patient continues to require skilled intervention due to debility.    Use of outcome tool(s) and clinical judgement create a POC that gives a: Difficult prediction of patient's progress: HIGH COMPLEXITY                 TREATMENT:   (In addition to Assessment/Re-Assessment sessions the following treatments were rendered)   Pre-treatment Symptoms/Complaints:  Did not verbalize pain but grimaces and tears up with movement  Pain: Initial:   Pain Intensity 1: 3  Post Session:  3      Assessment/Reassessment only, no treatment provided today     Braces/Orthotics/Lines/Etc:   · IV  · O2 Device: Nasal cannula  Treatment/Session Assessment:    · Response to Treatment:  Tolerated poorly due to confusion and pain. · Interdisciplinary Collaboration:  · Physical Therapist  · Registered Nurse  ·   · After treatment position/precautions:  · Supine in bed  · Bed/Chair-wheels locked  · Bed in low position  · RN notified  · Family at bedside  · Side rails x 3  · Compliance with Program/Exercises: Will assess as treatment progresses. · Recommendations/Intent for next treatment session: \"Next visit will focus on advancements to more challenging activities and reduction in assistance provided\".   Total Treatment Duration:  PT Patient Time In/Time Out  Time In: 1334  Time Out: [address] [address] [facility], PT, DPT

## 2017-04-27 NOTE — PROGRESS NOTES
Patient did not pass swallow eval video eval per family and has not had anything to eat since December except for pleasure feeds. Patient with large amount of green liquid emesis with clear phlegm per Linda Varghese in radiology. Feeding tube no residual.  Patient given Zofran 4 mg IV for nausea and vomiting. HOB elevated.

## 2017-04-27 NOTE — PROGRESS NOTES
PT note:    Chart reviewed and talked with nursing. Attempted evaluation twice this morning but pt off floor and then when returned to room having active vomiting. Will check back on pt this afternoon or as schedule allows when pt is more appropriate and can participate in evaluation.      Anju Guajardo, PT

## 2017-04-27 NOTE — PROGRESS NOTES
TRANSFER - IN REPORT:    Verbal report received from STEPHANIE Gutierrez(name) on Naga Menon  being received from ER(unit) for routine progression of care      Report consisted of patients Situation, Background, Assessment and   Recommendations(SBAR). Information from the following report(s) SBAR was reviewed with the receiving nurse. Opportunity for questions and clarification was provided. Assessment completed upon patients arrival to unit and care assumed.

## 2017-04-28 VITALS
RESPIRATION RATE: 16 BRPM | BODY MASS INDEX: 22.32 KG/M2 | HEIGHT: 63 IN | HEART RATE: 98 BPM | SYSTOLIC BLOOD PRESSURE: 140 MMHG | OXYGEN SATURATION: 97 % | DIASTOLIC BLOOD PRESSURE: 63 MMHG | WEIGHT: 126 LBS | TEMPERATURE: 98.2 F

## 2017-04-28 PROBLEM — M54.9 PAIN IN BACK: Chronic | Status: ACTIVE | Noted: 2017-04-27

## 2017-04-28 PROBLEM — A41.9 SEPSIS DUE TO URINARY TRACT INFECTION (HCC): Status: RESOLVED | Noted: 2017-04-27 | Resolved: 2017-04-28

## 2017-04-28 PROBLEM — R41.82 ALTERED MENTAL STATUS, UNSPECIFIED: Chronic | Status: ACTIVE | Noted: 2017-04-27

## 2017-04-28 PROBLEM — N39.0 SEPSIS DUE TO URINARY TRACT INFECTION (HCC): Status: RESOLVED | Noted: 2017-04-27 | Resolved: 2017-04-28

## 2017-04-28 PROBLEM — E86.0 DEHYDRATION: Status: RESOLVED | Noted: 2017-04-27 | Resolved: 2017-04-28

## 2017-04-28 PROBLEM — I95.9 HYPOTENSION: Status: RESOLVED | Noted: 2017-04-27 | Resolved: 2017-04-28

## 2017-04-28 PROBLEM — M25.562 PAIN IN LEFT KNEE: Chronic | Status: ACTIVE | Noted: 2017-04-27

## 2017-04-28 PROBLEM — Z66 DNR (DO NOT RESUSCITATE): Chronic | Status: ACTIVE | Noted: 2017-04-28

## 2017-04-28 PROCEDURE — 74011250637 HC RX REV CODE- 250/637: Performed by: HOSPITALIST

## 2017-04-28 PROCEDURE — 74011250636 HC RX REV CODE- 250/636: Performed by: INTERNAL MEDICINE

## 2017-04-28 PROCEDURE — 74011250637 HC RX REV CODE- 250/637: Performed by: INTERNAL MEDICINE

## 2017-04-28 PROCEDURE — 74011250636 HC RX REV CODE- 250/636: Performed by: HOSPITALIST

## 2017-04-28 PROCEDURE — 97167 OT EVAL HIGH COMPLEX 60 MIN: CPT

## 2017-04-28 PROCEDURE — 77030018798 HC PMP KT ENTRL FED COVD -A

## 2017-04-28 PROCEDURE — 74011000258 HC RX REV CODE- 258: Performed by: INTERNAL MEDICINE

## 2017-04-28 RX ORDER — LORAZEPAM 2 MG/ML
1 INJECTION INTRAMUSCULAR ONCE
Status: COMPLETED | OUTPATIENT
Start: 2017-04-28 | End: 2017-04-28

## 2017-04-28 RX ORDER — HYDROCODONE BITARTRATE AND ACETAMINOPHEN 10; 325 MG/1; MG/1
1 TABLET ORAL
Qty: 30 TAB | Refills: 0 | Status: SHIPPED | OUTPATIENT
Start: 2017-04-28

## 2017-04-28 RX ORDER — MORPHINE SULFATE 20 MG/5ML
5 SOLUTION ORAL
Qty: 1 BOTTLE | Refills: 0 | Status: SHIPPED | OUTPATIENT
Start: 2017-04-28

## 2017-04-28 RX ORDER — CEFDINIR 250 MG/5ML
300 POWDER, FOR SUSPENSION ORAL 2 TIMES DAILY
Qty: 84 ML | Refills: 0 | Status: SHIPPED
Start: 2017-04-28 | End: 2017-05-05

## 2017-04-28 RX ADMIN — Medication 10 ML: at 11:30

## 2017-04-28 RX ADMIN — BACLOFEN 10 MG: 10 TABLET ORAL at 08:07

## 2017-04-28 RX ADMIN — Medication 5 ML: at 06:00

## 2017-04-28 RX ADMIN — SUCRALFATE 1 G: 1 SUSPENSION ORAL at 08:08

## 2017-04-28 RX ADMIN — CEFTRIAXONE 1 G: 1 INJECTION, POWDER, FOR SOLUTION INTRAMUSCULAR; INTRAVENOUS at 08:07

## 2017-04-28 RX ADMIN — SUCRALFATE 1 G: 1 SUSPENSION ORAL at 11:33

## 2017-04-28 RX ADMIN — ASPIRIN 325 MG ORAL TABLET 325 MG: 325 PILL ORAL at 08:07

## 2017-04-28 RX ADMIN — LORAZEPAM 1 MG: 2 INJECTION INTRAMUSCULAR; INTRAVENOUS at 02:38

## 2017-04-28 RX ADMIN — ENOXAPARIN SODIUM 30 MG: 30 INJECTION SUBCUTANEOUS at 08:08

## 2017-04-28 RX ADMIN — MORPHINE SULFATE 5 MG: 100 SOLUTION ORAL at 08:46

## 2017-04-28 RX ADMIN — ONDANSETRON 4 MG: 2 INJECTION INTRAMUSCULAR; INTRAVENOUS at 08:08

## 2017-04-28 RX ADMIN — MORPHINE SULFATE 5 MG: 100 SOLUTION ORAL at 13:22

## 2017-04-28 NOTE — PROGRESS NOTES
TRANSFER - OUT REPORT:    Verbal report given to Sundeep Castillo RN(name) on Yoli Segura  being transferred to [facility](unit) for routine progression of care       Report consisted of patients Situation, Background, Assessment and   Recommendations(SBAR). Information from the following report(s) SBAR and MAR was reviewed with the receiving nurse. Lines:   Peripheral IV 04/27/17 Left Forearm (Active)   Site Assessment Clean, dry, & intact 4/28/2017  7:20 AM   Phlebitis Assessment 0 4/28/2017  7:20 AM   Infiltration Assessment 0 4/28/2017  7:20 AM   Dressing Status Clean, dry, & intact 4/28/2017  7:20 AM   Dressing Type Tape;Transparent 4/28/2017  7:20 AM   Hub Color/Line Status Infusing;Patent 4/28/2017  7:20 AM   Alcohol Cap Used No 4/28/2017  2:25 AM        Opportunity for questions and clarification was provided.       Patient transported with:   O2 @ 2 liters  Tech

## 2017-04-28 NOTE — PROGRESS NOTES
Care Management Interventions  Transition of Care Consult (CM Consult): SNF  Current Support Network: Nursing Facility  Confirm Follow Up Transport: Other (see comment) [facility])  Plan discussed with Pt/Family/Caregiver: Yes  Freedom of Choice Offered: Yes  Discharge Location  Discharge Placement: Home     Transfer back to [facility] with an order for Hospice consult and presentations. The family would like to proceed with Hospice services but there are seven daughters and they all want to be involved in the decision. Patient is on o2. She is not being tube fed at this time. She has script for morphine. She is asking her family to let her go and praying for God to take her. Facility gave permission for family to remain 24/ 7. Lamont Woods

## 2017-04-28 NOTE — PROGRESS NOTES
Zofran 4 mg IV given for nausea, HOB remains elevated. No residual from tube feeds. Having loose stools to formed stool.

## 2017-04-28 NOTE — PROGRESS NOTES
Problem: Self Care Deficits Care Plan (Adult)  Goal: *Acute Goals and Plan of Care (Insert Text)  1. Patient will complete self feeding tasks (pleasure food) with set up assistance and as needed cueing. 2. Patient will complete self grooming tasks with set up assistance and as needed cueing. 3. Patient will complete rolling in bed with maximal assistance to prevent skin breakdown. Comments:       OCCUPATIONAL THERAPY: Initial Assessment and AM 4/28/2017  INPATIENT: Hospital Day: 2  Payor: Isabel Davis / Plan: [address] [place] [address] HMO / Product Type: Managed Care Medicare /      NAME/AGE/GENDER: Isabel Childers is a 80 y.o. female            PRIMARY DIAGNOSIS:  Acute cystitis Sepsis due to urinary tract infection ([facility] [address] Sepsis due to urinary tract infection ([facility] [address]        ICD-10: Treatment Diagnosis:        · Generalized Muscle Weakness (M62.81)  · Other lack of cordination (R27.8)  · Hemiplegia and hemiparesis following cerebral infarction affecting left non-dominant side (R15.643)   Precautions/Allergies:         Codeine; Demerol [meperidine]; Novocain [procaine]; and Stadol [butorphanol tartrate]       ASSESSMENT:      Ms. Marylou Moy presents to hospital for above. Upon arrival of OT, pt was supine in bed, very drowsy. Family reports she hasn't been sleeping well over the last few days. In addition, they also report that she has experienced significant functional decline since a CVA in December of 2016. Family suggests that up until the last few days she was still participating in self-feeding and grooming tasks (i.e. feeding herself pleasure foods and washing her face), reporting now she is total care and requires total A to roll. Spoke with , pt will benefit from skilled OT services 1-2x/week on a trial basis to address self-feeding and grooming goals in order to increase quality of life and decrease burden of care. D/C plans TBD based on family's decisions for comfort care vs. SNF. Will follow up. This section established at most recent assessment   PROBLEM LIST (Impairments causing functional limitations):  1. Decreased Strength  2. Decreased ADL/Functional Activities  3. Decreased Transfer Abilities  4. Decreased Balance  5. Increased Pain  6. Decreased Activity Tolerance  7. Increased Fatigue  8. Decreased Flexibility/Joint Mobility  9. Decreased Knowledge of Precautions  10. Decreased [place] with Home Exercise Program  11. Decreased Cognition    INTERVENTIONS PLANNED: (Benefits and precautions of occupational therapy have been discussed with the patient.)  1. Activities of daily living training  2. Theraputic activity  3. Theraputic exercise      TREATMENT PLAN: Frequency/Duration: Follow patient 1x/week to address above goals. Rehabilitation Potential For Stated Goals: GUARDED      RECOMMENDED REHABILITATION/EQUIPMENT: (at time of discharge pending progress): Continue Skilled Therapy. OCCUPATIONAL PROFILE AND HISTORY:   History of Present Injury/Illness (Reason for Referral):  See H&P  Past Medical History/Comorbidities:   Ms. Daniela Gutierrez  has a past medical history of hyperlipidemia and Hypertension. Ms. Daniela Gutierrez  has no past surgical history on file. Social History/Living Environment:   Home Environment: Long term care  One/Two Story Residence: One story  Living Alone: No  Support Systems: Skilled nursing facility, Child(michael), Family member(s)  Patient Expects to be Discharged to[de-identified] Unknown  Current DME Used/Available at Home: Wheelchair  Tub or Shower Type:  (bed bath)  Prior Level of Function/Work/Activity:  Resident in LTC facility. Now total care for ADLs. Personal Factors:          Sex:  female        Age:  80 y.o.    Number of Personal Factors/Comorbidities that affect the Plan of Care: Extensive review of physical, cognitive, and psychosocial performance (3+):  HIGH COMPLEXITY   ASSESSMENT OF OCCUPATIONAL PERFORMANCE[de-identified]   Activities of Daily Living:           Basic ADLs (From Assessment) Complex ADLs (From Assessment)   Basic ADL  Feeding: Total assistance  Oral Facial Hygiene/Grooming: Total assistance  Bathing: Total assistance  Upper Body Dressing: Total assistance  Lower Body Dressing: Total assistance  Toileting: Total assistance Instrumental ADL  Meal Preparation: Total assistance  Homemaking: Total assistance  Medication Management: Total assistance  Financial Management: Total assistance   Grooming/Bathing/Dressing Activities of Daily Living     Cognitive Retraining  Safety/Judgement: Not assessed;Decreased awareness of environment                       Bed/Mat Mobility  Rolling: Total assistance          Most Recent Physical Functioning:   Gross Assessment:                  Posture:     Balance:    Bed Mobility:  Rolling: Total assistance  Wheelchair Mobility:     Transfers:                       Patient Vitals for the past 6 hrs:       BP SpO2 Pulse   17 1055 140/63 97 % 98        Mental Status  Neurologic State: Lethargic, Drowsy  Orientation Level: Unable to verbalize  Cognition: Unable to assess (comment), Decreased command following  Perception: Visual, Verbal  Perseveration: Perseverates during conversation  Safety/Judgement: Not assessed, Decreased awareness of environment                               Physical Skills Involved:  1. Range of Motion  2. Balance  3. Mobility  4. Strength  5. Endurance  6. Fine or Gross Motor Coordination Cognitive Skills Affected (resulting in the inability to perform in a timely and safe manner):  1. Attending  2. Perceiving  3. Thinking  4. Understanding  5. Problem Solving  6. Mental Sequencing  7. Learning  8. Remembering Psychosocial Skills Affected:  1. Interpersonal Interactions  2. Habits  3. Active Use of Coping Strategies  4.  Environmental Adaptations   Number of elements that affect the Plan of Care: 5+:  HIGH COMPLEXITY   CLINICAL DECISION MAKIN [facility] Form  How much help from another person does the patient currently need. .. Total A Lot A Little None   1. Putting on and taking off regular lower body clothing? [X] 1   [ ] 2   [ ] 3   [ ] 4   2. Bathing (including washing, rinsing, drying)? [X] 1   [ ] 2   [ ] 3   [ ] 4   3. Toileting, which includes using toilet, bedpan or urinal?   [X] 1   [ ] 2   [ ] 3   [ ] 4   4. Putting on and taking off regular upper body clothing? [X] 1   [ ] 2   [ ] 3   [ ] 4   5. Taking care of personal grooming such as brushing teeth? [X] 1   [ ] 2   [ ] 3   [ ] 4   6. Eating meals? [X] 1   [ ] 2   [ ] 3   [ ] 4   © 2007, Trustees of [address] [place] [address] [address] [address], under license to [employer]. All rights reserved    Score:  Initial: 6 Most Recent: X (Date: -- )     Interpretation of Tool:  Represents activities that are increasingly more difficult (i.e. Bed mobility, Transfers, Gait). Score 24 23 22-20 19-15 14-10 9-7 6       Modifier CH CI CJ CK CL CM CN         · Self Care:               - CURRENT STATUS:    CN - 100% impaired, limited or restricted               - GOAL STATUS:           CM - 80%-99% impaired, limited or restricted               - D/C STATUS:                       ---------------To be determined---------------  Payor: [employer] MEDICARE / Plan: [address] [place] [address] HMO / Product Type: Managed Care Medicare /       Medical Necessity:     · Skilled intervention continues to be required due to decreased independnce with self feeding and grooming . Reason for Services/Other Comments:  · Patient continues to require skilled intervention due to medical complications and patient unable to attend/participate in therapy as expected.    Use of outcome tool(s) and clinical judgement create a POC that gives a: HIGH COMPLEXITY             TREATMENT:   (In addition to Assessment/Re-Assessment sessions the following treatments were rendered)      Pre-treatment Symptoms/Complaints:    Pain: Initial:   Pain Intensity 1: 0  Post Session:  same      Assessment/Reassessment only, no treatment provided today     Braces/Orthotics/Lines/Etc:   · O2 Device: Nasal cannula  Treatment/Session Assessment:    · Response to Treatment:  Lethargic throughout session. Not interactive. · Interdisciplinary Collaboration:  · Occupational Therapist  · Registered Nurse  ·   · After treatment position/precautions:  · Supine in bed  · Bed alarm/tab alert on  · Bed/Chair-wheels locked  · Bed in low position  · Caregiver at bedside  · Call light within reach  · Family at bedside  · Compliance with Program/Exercises: Will assess as treatment progresses. · Recommendations/Intent for next treatment session: \"Next visit will focus on advancements to more challenging activities and reduction in assistance provided\".   Total Treatment Duration:  OT Patient Time In/Time Out  Time In: 1100  Time Out: [address]

## 2017-04-28 NOTE — PROGRESS NOTES
Problem: Nutrition Deficit  Goal: *Optimize nutritional status  Nutrition F/U:   TF management per nutritional support protocols. (Dr Lillie Dallas)  Assessment:   · Diet order: NPO  · Remains TF dependent via GT d/t dysphagia. No vomiting since TF was started yesterday at 1420. Residuals 0 ml, has recevied Zofran for nausea. Pt has had 8 BM's since dulcolax suppository was given yesterday AM.. Macronutrient needs:  EER: 1265-3856 kcal /day (25-30 kcal/kg listed BW)  EPR: 63-78 grams protein/day (1.2-1.5 grams/kg IBW)  Max CHO: 236 grams/day (55% kcal)  Fluid: 1ml/kcal  Intake/Comparative standards: Current TF: Osmolite 1.2 at 55 ml/hr with 25ml water q 1hr to provide 1584 kcal/day (100% of needs), 73 grams protein/day (100% of needs), 209 grams CHO/day (does not exceed max CHO), and ~ 1670ml free water/day (100% of needs).      Intervention:  Meals and snacks: NPO  EN:Continue current TF   IV: IVF was stopped this AM.     Dominga Rivas, 66 [address], [facility] [address] [place] [address]

## 2017-04-28 NOTE — PROGRESS NOTES
Seen and examined. Family reports mental status and functional ability at baseline since her Dec 2016 stroke. She is unable to take PO but gets everything via PEG.   Vitals have improved and if BP remains stable she can be discharged back to SNF

## 2017-04-28 NOTE — PROGRESS NOTES
Notified Dr. Danielle Corrales patient with loose stools and leakage around area that had to be disimpacted, pt tolerated procedure well, family at bedside, staff at bedside.

## 2017-04-28 NOTE — DISCHARGE SUMMARY
Hospitalist Discharge Summary     Admit Date:  2017  2:18 AM   Name:  Moises Vo   Age:  80 y.o.  :  2/10/1924   MRN:  [id number]   PCP:  Marino Alfaro MD  Treatment Team: Attending Provider: Sharri Dickens MD; Utilization Review: Ashleigh Barbosa RN    Problem List for this Hospitalization:  Hospital Problems as of 2017  Never Reviewed          Codes Class Noted - Resolved POA    DNR (do not resuscitate) (Chronic) ICD-10-CM: Z66  ICD-9-CM: V49.86  2017 - Present Yes        Acute cystitis ICD-10-CM: N30.00  ICD-9-CM: 595.0  2017 - Present Yes        Altered mental status, unspecified (Chronic) ICD-10-CM: R41.82  ICD-9-CM: 780.97  2017 - Present Yes        Pain in back (Chronic) ICD-10-CM: M54.9  ICD-9-CM: 724.5  2017 - Present Yes        Pain in left knee (Chronic) ICD-10-CM: Q50.004  ICD-9-CM: 719.46  2017 - Present Yes        History of CVA (cerebrovascular accident) (Chronic) ICD-10-CM: Z86.73  ICD-9-CM: V12.54  2017 - Present Yes        Anemia (Chronic) ICD-10-CM: D64.9  ICD-9-CM: 285.9  2017 - Present Yes        RESOLVED: Hypotension ICD-10-CM: I95.9  ICD-9-CM: 458.9  2017 - 2017 Yes        * (Principal)RESOLVED: Sepsis due to urinary tract infection ([facility] [address] ICD-10-CM: A41.9, N39.0  ICD-9-CM: 038.9, 995.91, 599.0  2017 - 2017 Yes        RESOLVED: Dehydration ICD-10-CM: E86.0  ICD-9-CM: 276.51  2017 - 2017 Yes                Admission HPI from 2017:    \" Patient is a 80 y.o. female who presents to the ER from [facility] with decreased LOC. Pt has hx CVA Dec 2016 and resultant left hemiparesis and dysphagia. PEG tube for feeding. Accompanied by family members. History of UTI. Lonn Ebbing out of bed the other day and has been indicating severe back pain. Family states xrays were performed but they do not know the results. They note pt's left knee is swollen and has had pain in right shoulder.  Pt currently difficult to arouse. No N/V. No resp symptoms. Pt is hypotensive and appears dehydrated. She is on hospice and has DNR directives. Has been on different abx lately including bactrim, macrobid and levaquin. Vancomycin and zosyn were started in the ED. \"    Hospital Course:  Patient's hypotension improved with IVF. She was switched to rocephin for UTI. Family reports today that mental status and functional ability at baseline since her Dec 2016 stroke. She is unable to take PO but gets everything via PEG. No fevers. Hypotension has improved so she can be discharged back to SNF. She was prescribed omnicef due to liquid form for PEG. Physician and dietician at SNF should try to ensure patient stays euvolemic with her TFs to avoid hypotension. Follow up instructions below. Plan was discussed with pt's family. All questions answered. Patient was stable at time of discharge and was instructed to call or return if there are any concerns or recurrence of symptoms. Diagnostic Imaging/Tests:    XR KNEE LT MAX 2 VWS (Final result) Result time: 04/27/17 13:37:57     Final result     Impression:     IMPRESSION: Osteopenia and severe tricompartmental osteoarthritis. .         Narrative:     LEFT KNEE 3 views. HISTORY: Left knee pain following fall. TECHNIQUE: AP and lateral and oblique views. COMPARISON: None. FINDINGS: There is diffuse osteopenia. Severe osteoarthritis with severe  narrowing of the medial joint compartment. There are osteophytes throughout.                 XR SHOULDER RT 1V (Final result) Result time: 04/27/17 13:40:46     Final result     Impression:     IMPRESSION: No gross fracture on this single view. There is diffuse osteopenia.           Narrative:     RIGHT SHOULDER 1 views. HISTORY: Right shoulder pain acutely following fall. TECHNIQUE: AP externally rotated views.     COMPARISON: None.                 XR PELV AP ONLY (Final result) Result time: 04/27/17 13:38:46     Final result   Impression:     IMPRESSION:  Negative for acute fracture.        Narrative:     PELVIS, 1 VIEW. INDICATION:   Pain following fall. COMPARISON: None. FINDINGS: The bony pelvic ring appears intact.  SI joints appear symmetric. Pubic rami are intact. No acute fracture identified. Moderate stool in the  rectosigmoid. Arterial calcifications are present in the femoral arteries.                 XR SPINE LUMB 2 OR 3 V (Final result) Result time: 04/27/17 13:42:29     Final result     Impression:     IMPRESSION: Minimal compression L1. No canal compromise. Degenerative change.         Narrative:     LUMBAR SPINE, 3 views. HISTORY: Low back pain following fall. .    TECHNIQUE: AP, lateral and cone-down lumbosacral junction films. FINDINGS: Spinal alignment anatomic. There is diffuse osteopenia. Pedicles  appear intact.  Disk spaces maintained. Minimal compression of L1. No canal  compromise. Degenerative changes lower thoracic spine.                  XR CHEST PORT (Final result) Result time: 04/27/17 03:59:10     Final result     Impression:     IMPRESSION:    Unchanged cardiomegaly with bilateral interstitial prominence, this could be  chronic given similar appearance to previous exam although cannot exclude mild  vascular congestion or atypical infectious process. Likely small bilateral pleural effusions with adjacent atelectasis/infiltrate.     Narrative:       Exam: Single view of the chest    Indication: Altered mental status    Comparison: Chest radiograph from March 3, 2017    Findings:   The patient is rotated. Cardiac silhouette is enlarged but unchanged from  previous exam. Left cardiac pacer is in unchanged position. Nonspecific  bilateral interstitial prominence similar to prior exam. Suspect small bilateral  pleural effusions with overlying atelectasis/infiltrate. No evidence of  pneumothorax.  No acute osseous abnormality.            All Micro Results     Procedure Component Value Units Date/Time    CULTURE, BLOOD [[id number]] Collected:  04/27/17 0230    Order Status:  Completed Specimen:  Blood from Blood Updated:  04/28/17 1013     Special Requests: RIGHT ANTECUBITAL        Culture result: NO GROWTH 1 DAY       CULTURE, BLOOD [[id number]] Collected:  04/27/17 0305    Order Status:  Completed Specimen:  Blood from Blood Updated:  04/28/17 1013     Special Requests: LEFT ANTECUBITAL        Culture result: NO GROWTH 1 DAY       MRSA SCREEN - PCR (NASAL) [[id number]] Collected:  04/27/17 0654    Order Status:  Completed Specimen:  Nasal from Nasal Swab Updated:  04/27/17 0839     Special Requests: NO SPECIAL REQUESTS        Culture result:         MRSA target DNA is not detected (presumptive not colonized with MRSA)          Labs: Results:       BMP, Mg, Phos Recent Labs      04/27/17 0230   NA  142   K  4.0   CL  102   CO2  36*   AGAP  4*   BUN  20   CREA  0.55*   CA  8.2*   GLU  141*      CBC Recent Labs      04/27/17 0230   WBC  11.1   RBC  3.09*   HGB  9.2*   HCT  27.7*   PLT  223   GRANS  84*   LYMPH  7*   EOS  1   MONOS  7   BASOS  0   IG  0.6   ANEU  9.3*   ABL  0.8   FREDDIE  0.1   ABM  0.8   ABB  0.0   AIG  0.1      LFT Recent Labs      04/27/17 0230   SGOT  11*   ALT  15   AP  91   TP  5.5*   ALB  2.1*   GLOB  3.4   AGRAT  0.6*      Cardiac Testing No results found for: BNPP, BNP, CPK, RCK1, RCK2, RCK3, RCK4, CKMB, CKNDX, CKND1, TROPT, TROIQ   Coagulation Tests Lab Results   Component Value Date/Time    Prothrombin time 11.7 03/03/2017 03:45 PM    INR 1.1 03/03/2017 03:45 PM      A1c No results found for: HBA1C, HGBE8, VMJ8IFGC, BUU0JMTL   Lipid Panel No results found for: CHOL, CHOLPOCT, CHOLX, CHLST, CHOLV, [id number], HDL, LDL, NLDLCT, DLDL, LDLC, DLDLP, [id number], VLDLC, VLDL, TGL, TGLX, TRIGL, [id number], TRIGP, TGLPOCT, [id number], CHHD, CHHDX   Thyroid Panel No results found for: T4, T3U, TSH, TSHEXT, TSHEXT     Most Recent UA Lab Results   Component Value Date/Time    Color SHAMA 04/27/2017 07:04 AM    Appearance CLOUDY 04/27/2017 07:04 AM    Specific gravity 1.015 04/27/2017 07:04 AM    pH (UA) 7.5 04/27/2017 07:04 AM    Protein NEGATIVE  04/27/2017 07:04 AM    Glucose NEGATIVE  04/27/2017 07:04 AM    Ketone NEGATIVE  04/27/2017 07:04 AM    Bilirubin NEGATIVE  04/27/2017 07:04 AM    Blood NEGATIVE  04/27/2017 07:04 AM    Urobilinogen 1.0 04/27/2017 07:04 AM    Nitrites POSITIVE 04/27/2017 07:04 AM    Leukocyte Esterase SMALL 04/27/2017 07:04 AM        Allergies   Allergen Reactions    Codeine Other (comments)     Passes out    Demerol [Meperidine] Hives    Novocain [Procaine] Other (comments)     Passes out    Stadol [Butorphanol Tartrate] Hives       There is no immunization history on file for this patient. All Labs from Last 24 Hrs:  No results found for this or any previous visit (from the past 24 hour(s)). Discharge Exam:  Patient Vitals for the past 24 hrs:   Temp Pulse Resp BP SpO2   04/28/17 1055 98.2 °F (36.8 °C) 98 16 140/63 97 %   04/28/17 0720 98.9 °F (37.2 °C) (!) 108 16 157/72 94 %   04/27/17 2229 98.2 °F (36.8 °C) (!) 105 16 134/68 93 %   04/27/17 1858 98.1 °F (36.7 °C) (!) 105 15 122/77 92 %   04/27/17 1420 97.1 °F (36.2 °C) 94 16 130/60 95 %     Oxygen Therapy  O2 Sat (%): 97 % (04/28/17 1055)  Pulse via Oximetry: 69 beats per minute (04/27/17 0530)  O2 Device: Nasal cannula (04/27/17 0500)  O2 Flow Rate (L/min): 2 l/min (04/27/17 0500)    Intake/Output Summary (Last 24 hours) at 04/28/17 1228  Last data filed at 04/28/17 1200   Gross per 24 hour   Intake              440 ml   Output                0 ml   Net              440 ml       General:    Frail appearing. Altered but family reports baseline  CV:   Regular rate and rhythm. No murmur, rub, or gallop. Lungs:  Clear to auscultation bilaterally. No wheezing, rhonchi, or rales. Abdomen: Soft, nontender, nondistended. Bowel sounds normal.  PEG intact  Extremities: Warm and dry. No cyanosis or edema.   Skin:     No rashes or jaundice. Discharge Info:   Current Discharge Medication List      START taking these medications    Details   cefdinir (OMNICEF) 250 mg/5 mL suspension Take 6 mL by mouth two (2) times a day for 7 days. Indications: UTI  Qty: 84 mL, Refills: 0         CONTINUE these medications which have CHANGED    Details   HYDROcodone-acetaminophen (NORCO)  mg tablet Take 1 Tab by mouth every eight (8) hours as needed for Pain. Max Daily Amount: 3 Tabs. Qty: 30 Tab, Refills: 0      morphine 20 mg/5 mL (4 mg/mL) solution Take 1.25 mL by mouth every two (2) hours as needed for Pain. Max Daily Amount: 60 mg.  Qty: 1 Bottle, Refills: 0         CONTINUE these medications which have NOT CHANGED    Details   NUTRITIONAL SUPPLEMENT (OSMOLITE 1 RADHA PO) Take  by mouth. 70 ml/hr via peg starting at 2 pm, stop at 10 am      guaiFENesin (ROBITUSSIN) 100 mg/5 mL liquid Take 400 mg by mouth three (3) times daily as needed for Cough. Oxygen 2LNC      OTHER Water flush 125 ml every 4 hours      ondansetron hcl (ZOFRAN, AS HYDROCHLORIDE,) 4 mg tablet Take 4 mg by mouth every eight (8) hours as needed for Nausea. aspirin (ASPIRIN) 325 mg tablet 325 mg by PEG Tube route daily. atorvastatin (LIPITOR) 40 mg tablet 40 mg by PEG Tube route daily. gabapentin (NEURONTIN) 300 mg capsule 300 mg by PEG Tube route nightly. lisinopril (PRINIVIL, ZESTRIL) 2.5 mg tablet 2.5 mg by PEG Tube route daily. tamsulosin (FLOMAX) 0.4 mg capsule 0.4 mg by PEG Tube route daily. metoprolol succinate (TOPROL-XL) 25 mg XL tablet 12.5 mg by PEG Tube route two (2) times a day. sucralfate (CARAFATE) 1 gram tablet 1 g by PEG Tube route four (4) times daily. baclofen (LIORESAL) 10 mg tablet 10 mg by PEG Tube route two (2) times a day. bisacodyl (DULCOLAX, BISACODYL,) 10 mg suppository Insert 10 mg into rectum daily.       magnesium hydroxide (SORIA MILK OF MAGNESIA) 400 mg/5 mL suspension by PEG Tube route daily. alum-mag hydroxide-simeth (MYLANTA) 200-200-20 mg/5 mL susp 30 mL by PEG Tube route every four (4) hours as needed. acetaminophen (TYLENOL) 325 mg tablet 650 mg by PEG Tube route every six (6) hours as needed for Pain. STOP taking these medications       nitrofurantoin, macrocrystal-monohydrate, (MACROBID) 100 mg capsule Comments:   Reason for Stopping:         cetirizine (ZYRTEC) 10 mg tablet Comments:   Reason for Stopping:         traMADol (ULTRAM) 5 mg/mL susp 5 mg/mL oral suspension (compounded) Comments:   Reason for Stopping:                 Disposition: SNF  Activity: PT/OT Eval and Treat  Diet: Resume previous diet of PEG feeding. DIET TUBE FEEDING Osmolite 1.2 at 55 ml/hr and 25 ml water every 1 hr. Keep HOB > 30 degrees. Check residuals every 4 hours. Hold TF for > 500 ml x 1 or 250 ml x 2 consecutive checks. Also place patient on right side if residual is > 250 ml. (Order #[id number]) on 4/27/17    Follow-up Information     Follow up With Details Comments Contact Info    Reyes Pancoast, MD Call As needed [address] [facility] in [address] [place] [address] today return to long term care at previous SNF             Time spent in patient discharge planning and coordination 35 minutes.     Signed:  Mirian Blanco MD
